# Patient Record
Sex: FEMALE | HISPANIC OR LATINO | Employment: FULL TIME | URBAN - METROPOLITAN AREA
[De-identification: names, ages, dates, MRNs, and addresses within clinical notes are randomized per-mention and may not be internally consistent; named-entity substitution may affect disease eponyms.]

---

## 2022-08-22 LAB
BACTERIA SPEC AEROBE CULT: NO GROWTH
EXTERNAL ABO GROUPING: NORMAL
EXTERNAL ANTIBODY SCREEN: NORMAL
EXTERNAL HEMOGLOBIN: 13.8 G/DL
EXTERNAL HEPATITIS B SURFACE ANTIGEN: NEGATIVE
EXTERNAL PLATELET COUNT: 339 K/ΜL
EXTERNAL RH FACTOR: POSITIVE
EXTERNAL SYPHILIS RPR SCREEN: NORMAL
HEMOGLOBIN FRACTIONATION: NORMAL
HIV 1+2 AB+HIV1 P24 AG SERPL QL IA: NONREACTIVE
RUBV IGG SERPL IA-ACNC: NORMAL

## 2022-09-19 ENCOUNTER — INITIAL PRENATAL (OUTPATIENT)
Dept: OBSTETRICS AND GYNECOLOGY | Facility: CLINIC | Age: 22
End: 2022-09-19

## 2022-09-19 VITALS
BODY MASS INDEX: 36.88 KG/M2 | HEIGHT: 62 IN | WEIGHT: 200.4 LBS | SYSTOLIC BLOOD PRESSURE: 120 MMHG | DIASTOLIC BLOOD PRESSURE: 70 MMHG

## 2022-09-19 DIAGNOSIS — Z34.01 ENCOUNTER FOR SUPERVISION OF NORMAL FIRST PREGNANCY IN FIRST TRIMESTER: Primary | ICD-10-CM

## 2022-09-19 PROCEDURE — 99204 OFFICE O/P NEW MOD 45 MIN: CPT | Performed by: OBSTETRICS & GYNECOLOGY

## 2022-09-19 NOTE — PROGRESS NOTES
"Subjective   Chief Complaint   Patient presents with   • Initial Prenatal Visit     No c/c       Viry Obrien is a 22 y.o. year old .  Patient's last menstrual period was 06/10/2022.  She presents to be seen to initiate prenatal care.  [place]  She reports she saw 2 providers there.  We have most of her records including lab work and nuchal translucency ultrasound.  I do not see the 8-week ultrasound that she reports was done.  I do not have a hepatitis C lab Pap smear or STI screening lab work.  She reports she is currently in between jobs and will be starting her new job soon with full insurance.  She has no complaints today.  She reports she had some bleeding at the beginning of pregnancy but none recently.    Social History    Tobacco Use      Smoking status: Never Smoker      Smokeless tobacco: Never Used      The following portions of the patient's history were reviewed and updated as appropriate:vital signs, allergies, current medications, past family history, past medical history, past social history, past surgical history and problem list.    Objective   /70   Ht 157.5 cm (62\")   Wt 90.9 kg (200 lb 6.4 oz)   LMP 06/10/2022   BMI 36.65 kg/m²     General: well developed; well nourished  no acute distress   Skin: Not performed.   Thyroid: not examined   Heart:  Not performed.   Lungs: breathing is unlabored   Breasts:  Not performed.   Abdomen: Not performed.   Pelvis: Not performed.       Lab Review   OB labs except STI screening, PAP and hepatitis C    Imaging   Pelvic ultrasound report from 12 weeks with normal NT    Assessment & Plan   ASSESSMENT  1. 22 y.o. year old  at 14w3d confirmed by 8 weeks u/s per patient   2. Supervision of low risk pregnancy    PLAN  1. The problem list for pregnancy was initiated today  2. Tests ordered today:  Orders Placed This Encounter   Procedures   • Urine Culture - Urine,     This is an external result entered " through the Results Console.   • US Ob 14 + Weeks Single or First Gestation     Standing Status:   Future     Standing Expiration Date:   9/19/2023     Order Specific Question:   Reason for Exam:     Answer:   Anatomic screening   • HIV-1 / O / 2 Ag / Antibody 4th Generation     This is an external result entered through the Results Console.     Order Specific Question:   Release to patient     Answer:   Routine Release   • Obstetric Panel     This is an external result entered through the Results Console.     Order Specific Question:   Release to patient     Answer:   Routine Release   • Hemoglobinopathy Fractionation Cascade     This is an external result entered through the Results Console.     Order Specific Question:   Release to patient     Answer:   Routine Release   • Hepatitis C Antibody     Standing Status:   Future     Standing Expiration Date:   9/19/2023     Order Specific Question:   Release to patient     Answer:   Routine Release     3. Testing for GC / Chlamydia / trichomonas will request records   The following data needs to be obtained to update her medical records: last PAP and STI screening, ultrasounds.  4. Genetic testing reviewed: NT alone and normal. Declines lab work   5. Information reviewed: exercise in pregnancy, nutrition in pregnancy, weight gain in pregnancy, work and travel restrictions during pregnancy, list of OTC medications acceptable in pregnancy and call coverage groups    Total time spent today with Viry  was 45-59 minutes (level 4).  Of this time, > 50% was spent face-to-face time coordinating care, answering her questions and counseling regarding pathophysiology of her presenting problem along with plans for any diagnositc work-up and treatment.    Follow up: 4 week(s) .  We will review records at next visit to determine whether additional lab work is needed including hepatitis C lab STI screening and Pap smear.  Technically she would need a full physical exam as well.        This note was electronically signed.    Winsome Quintero MD  September 19, 2022

## 2022-09-23 ENCOUNTER — PATIENT ROUNDING (BHMG ONLY) (OUTPATIENT)
Dept: OBSTETRICS AND GYNECOLOGY | Facility: CLINIC | Age: 22
End: 2022-09-23

## 2022-09-23 NOTE — PROGRESS NOTES
A [employer] message has been sent to the patient for PATIENT ROUNDING with [facility].

## 2022-10-17 ENCOUNTER — ROUTINE PRENATAL (OUTPATIENT)
Dept: OBSTETRICS AND GYNECOLOGY | Facility: CLINIC | Age: 22
End: 2022-10-17

## 2022-10-17 VITALS — SYSTOLIC BLOOD PRESSURE: 118 MMHG | WEIGHT: 207 LBS | DIASTOLIC BLOOD PRESSURE: 72 MMHG | BODY MASS INDEX: 37.86 KG/M2

## 2022-10-17 DIAGNOSIS — Z34.02 ENCOUNTER FOR SUPERVISION OF NORMAL FIRST PREGNANCY IN SECOND TRIMESTER: ICD-10-CM

## 2022-10-17 DIAGNOSIS — Z3A.18 18 WEEKS GESTATION OF PREGNANCY: Primary | ICD-10-CM

## 2022-10-17 PROCEDURE — 0502F SUBSEQUENT PRENATAL CARE: CPT | Performed by: NURSE PRACTITIONER

## 2022-10-17 NOTE — PROGRESS NOTES
Chief Complaint   Patient presents with   • Routine Prenatal Visit       HPI: Viry is a  currently at 18w3d who today reports the following: She is moving to [place] after next visit.   Contractions - No; Leaking - No; Vaginal bleeding -  No; Heartburn - mild.    ROS:  GI: Nausea - No ; Constipation - No; Diarrhea - No    Neuro: Headache - No ; Visual change - No      EXAM:  Vitals: See prenatal flowsheet   Abdomen: See prenatal flowsheet   Urine glucose/protein: See prenatal flowsheet   Pelvic: See prenatal flowsheet     Lab Results   Component Value Date    ABO O 2022    RH Positive 2022    ABSCRN Normal 2022       MDM:  Impression: 1. Supervision of low risk pregnancy   Tests done today: 1. none   Topics discussed: 1. Continue with PNV's  2. Prenatal labs reviewed  3. flu vaccine- she is getting at her job tomorrow   Tests scheduled today for her next visit:   U/S for anatomic screening

## 2022-11-06 PROBLEM — Z34.02 ENCOUNTER FOR SUPERVISION OF NORMAL FIRST PREGNANCY IN SECOND TRIMESTER: Status: ACTIVE | Noted: 2022-09-19

## 2022-11-06 PROBLEM — O99.210 OBESITY IN PREGNANCY, ANTEPARTUM: Status: ACTIVE | Noted: 2022-11-06

## 2022-11-07 ENCOUNTER — ROUTINE PRENATAL (OUTPATIENT)
Dept: OBSTETRICS AND GYNECOLOGY | Facility: CLINIC | Age: 22
End: 2022-11-07

## 2022-11-07 VITALS — SYSTOLIC BLOOD PRESSURE: 110 MMHG | BODY MASS INDEX: 38.37 KG/M2 | WEIGHT: 209.8 LBS | DIASTOLIC BLOOD PRESSURE: 70 MMHG

## 2022-11-07 DIAGNOSIS — Z34.02 ENCOUNTER FOR SUPERVISION OF NORMAL FIRST PREGNANCY IN SECOND TRIMESTER: Primary | ICD-10-CM

## 2022-11-07 LAB
C TRACH RRNA SPEC DONR QL NAA+PROBE: NEGATIVE
N GONORRHOEA DNA SPEC QL NAA+PROBE: NEGATIVE

## 2022-11-07 PROCEDURE — 0502F SUBSEQUENT PRENATAL CARE: CPT | Performed by: OBSTETRICS & GYNECOLOGY

## 2022-11-07 NOTE — PROGRESS NOTES
Chief Complaint   Patient presents with   • Routine Prenatal Visit     US done today       HPI: Viry is a  currently at 21w3d who today reports the following:  Contractions - No; Leaking - No; Vaginal bleeding -  No; Heartburn - No.  She had the flu vaccine at work  We still never received her STI screening or Pap smears.  ROS:  GI: Nausea - No ; Constipation - No; Diarrhea - No    Neuro: Headache - No ; Visual change - No      EXAM:  Vitals: See prenatal flowsheet   Abdomen: See prenatal flowsheet   Urine glucose/protein: See prenatal flowsheet   Pelvic: See prenatal flowsheet                 Lab Results   Component Value Date    ABO O 2022    RH Positive 2022    ABSCRN Normal 2022       General: well developed; well nourished  no acute distress   Skin: No suspicious lesions seen   Thyroid: normal to inspection and palpation   Heart:  Not performed.   Lungs: breathing is unlabored   Breasts:  Examined in supine position  Symmetric without masses or skin dimpling  Nipples normal without inversion, lesions or discharge  There are no palpable axillary nodes   Abdomen: soft, non-tender; no masses  no umbilical or inguinal hernias are present  no hepato-splenomegaly   Pelvis: Clinical staff was present for exam  External genitalia:  normal appearance of the external genitalia including Bartholin's and Shaft's glands.  :  urethral meatus normal;  Vaginal:  normal pink mucosa without prolapse or lesions.  Cervix:  normal appearance. ectropian present; blood is seen coming from external cervical os;  Uterus:  normal size, shape and consistency.  Adnexa:  normal bimanual exam of the adnexa.  Rectal:  digital rectal exam not performed; anus visually normal appearing.         MDM:  Impression: 1. Supervision of low risk pregnancy   Tests done today: 1. U/S for anatomic screening - anatomy was not completely seen today   2. Leukorrhea swab and pap smear   3. Hep C Ab   Topics discussed: 1. Continue  with PNV's  2. Prenatal labs reviewed  3. No additional counseling given - she has no specific complaints or concerns   Tests scheduled today for her next visit:    U/S to complete the anatomic screening

## 2022-11-09 ENCOUNTER — DOCUMENTATION (OUTPATIENT)
Dept: OBSTETRICS AND GYNECOLOGY | Facility: CLINIC | Age: 22
End: 2022-11-09

## 2022-11-11 PROBLEM — Z01.419 WELL WOMAN EXAM: Status: ACTIVE | Noted: 2022-11-11

## 2022-11-11 LAB — REF LAB TEST METHOD: NORMAL

## 2022-12-01 ENCOUNTER — LAB (OUTPATIENT)
Dept: LAB | Facility: HOSPITAL | Age: 22
End: 2022-12-01

## 2022-12-01 DIAGNOSIS — Z34.01 ENCOUNTER FOR SUPERVISION OF NORMAL FIRST PREGNANCY IN FIRST TRIMESTER: ICD-10-CM

## 2022-12-01 LAB — HCV AB SER DONR QL: NORMAL

## 2022-12-01 PROCEDURE — 86803 HEPATITIS C AB TEST: CPT

## 2022-12-07 ENCOUNTER — ROUTINE PRENATAL (OUTPATIENT)
Dept: OBSTETRICS AND GYNECOLOGY | Facility: CLINIC | Age: 22
End: 2022-12-07

## 2022-12-07 VITALS — DIASTOLIC BLOOD PRESSURE: 68 MMHG | BODY MASS INDEX: 39.25 KG/M2 | WEIGHT: 214.6 LBS | SYSTOLIC BLOOD PRESSURE: 102 MMHG

## 2022-12-07 DIAGNOSIS — Z34.02 ENCOUNTER FOR SUPERVISION OF NORMAL FIRST PREGNANCY IN SECOND TRIMESTER: Primary | ICD-10-CM

## 2022-12-07 PROCEDURE — 0502F SUBSEQUENT PRENATAL CARE: CPT | Performed by: OBSTETRICS & GYNECOLOGY

## 2022-12-07 NOTE — PROGRESS NOTES
Chief Complaint   Patient presents with   • Routine Prenatal Visit     US [facility] today       HPI: Viry is a  currently at 25w5d who today reports the following:  Contractions - No; Leaking - No; Vaginal bleeding -  No; Heartburn - No.    ROS:  GI: Nausea - No ; Constipation - No; Diarrhea - No    Neuro: Headache - No ; Visual change - No      EXAM:  Vitals: See prenatal flowsheet   Abdomen: See prenatal flowsheet   Urine glucose/protein: See prenatal flowsheet   Pelvic: See prenatal flowsheet     Lab Results   Component Value Date    ABO O 2022    RH Positive 2022    ABSCRN Normal 2022       MDM:  Impression: 1. Supervision of low risk pregnancy   Tests done today: 1. U/S to complete the anatomic screening - anatomy completely seen today   Topics discussed: 1. Continue with PNV's  2. Prenatal labs reviewed  3.  labor signs and symptoms  4. Symptoms of preeclampsia  5. TDAP vaccination recommended between 27-36 weeks gestation OR after birth   Tests scheduled today for her next visit:   GCT  CBC   She knows to get this done prior to 28 weeks    Orders Placed This Encounter   Procedures   • Glucose, Post 50 Gm Glucola     Standing Status:   Future     Standing Expiration Date:   2023   • CBC (No Diff)     Standing Status:   Future     Standing Expiration Date:   2023     Order Specific Question:   Release to patient     Answer:   Immediate   • Ambulatory Referral to Massage Therapy     Referral Priority:   Routine     Referral Type:   Consultation     Referral Reason:   Specialty Services Required     Number of Visits Requested:   1

## 2022-12-15 ENCOUNTER — LAB (OUTPATIENT)
Dept: LAB | Facility: HOSPITAL | Age: 22
End: 2022-12-15

## 2022-12-15 DIAGNOSIS — Z34.02 ENCOUNTER FOR SUPERVISION OF NORMAL FIRST PREGNANCY IN SECOND TRIMESTER: ICD-10-CM

## 2022-12-15 LAB
DEPRECATED RDW RBC AUTO: 40.5 FL (ref 37–54)
ERYTHROCYTE [DISTWIDTH] IN BLOOD BY AUTOMATED COUNT: 13.8 % (ref 12.3–15.4)
GLUCOSE 1H P 100 G GLC PO SERPL-MCNC: 127 MG/DL (ref 65–139)
HCT VFR BLD AUTO: 35.1 % (ref 34–46.6)
HGB BLD-MCNC: 11.7 G/DL (ref 12–15.9)
MCH RBC QN AUTO: 27.3 PG (ref 26.6–33)
MCHC RBC AUTO-ENTMCNC: 33.3 G/DL (ref 31.5–35.7)
MCV RBC AUTO: 81.8 FL (ref 79–97)
PLATELET # BLD AUTO: 348 10*3/MM3 (ref 140–450)
PMV BLD AUTO: 9.5 FL (ref 6–12)
RBC # BLD AUTO: 4.29 10*6/MM3 (ref 3.77–5.28)
WBC NRBC COR # BLD: 12.06 10*3/MM3 (ref 3.4–10.8)

## 2022-12-15 PROCEDURE — 85027 COMPLETE CBC AUTOMATED: CPT

## 2022-12-15 PROCEDURE — 82962 GLUCOSE BLOOD TEST: CPT

## 2022-12-15 PROCEDURE — 82950 GLUCOSE TEST: CPT

## 2023-01-05 ENCOUNTER — ROUTINE PRENATAL (OUTPATIENT)
Dept: OBSTETRICS AND GYNECOLOGY | Facility: CLINIC | Age: 23
End: 2023-01-05
Payer: COMMERCIAL

## 2023-01-05 VITALS — DIASTOLIC BLOOD PRESSURE: 74 MMHG | SYSTOLIC BLOOD PRESSURE: 110 MMHG | BODY MASS INDEX: 40.53 KG/M2 | WEIGHT: 221.6 LBS

## 2023-01-05 DIAGNOSIS — O99.210 OBESITY IN PREGNANCY, ANTEPARTUM: ICD-10-CM

## 2023-01-05 DIAGNOSIS — Z34.03 ENCOUNTER FOR SUPERVISION OF NORMAL FIRST PREGNANCY IN THIRD TRIMESTER: ICD-10-CM

## 2023-01-05 DIAGNOSIS — Z3A.29 29 WEEKS GESTATION OF PREGNANCY: Primary | ICD-10-CM

## 2023-01-05 LAB
GLUCOSE UR STRIP-MCNC: NEGATIVE MG/DL
PROT UR STRIP-MCNC: NEGATIVE MG/DL

## 2023-01-05 PROCEDURE — 90715 TDAP VACCINE 7 YRS/> IM: CPT | Performed by: OBSTETRICS & GYNECOLOGY

## 2023-01-05 PROCEDURE — 0502F SUBSEQUENT PRENATAL CARE: CPT | Performed by: OBSTETRICS & GYNECOLOGY

## 2023-01-05 PROCEDURE — 90471 IMMUNIZATION ADMIN: CPT | Performed by: OBSTETRICS & GYNECOLOGY

## 2023-01-05 NOTE — PROGRESS NOTES
Chief Complaint   Patient presents with   • Routine Prenatal Visit     No c/c       HPI: Viry is a  currently at 29w6d who today reports the following:  Contractions - No; Leaking - No; Vaginal bleeding -  No; Heartburn - No.  She sometimes has sore hands when she wakes up  She states she still craves ice a lot but not every day   ROS:  GI: Nausea - No ; Constipation - No; Diarrhea - No    Neuro: Headache - No ; Visual change - No      EXAM:  Vitals: See prenatal flowsheet   Abdomen: See prenatal flowsheet   Urine glucose/protein: See prenatal flowsheet   Pelvic: See prenatal flowsheet     Lab Results   Component Value Date    HGB 11.7 (L) 12/15/2022    HCT 35.1 12/15/2022     12/15/2022    ABO O 2022    RH Positive 2022    ABSCRN Normal 2022       MDM:  Impression: 1. Supervision of low risk pregnancy   2. Ice craving - normal Hgb at 28 week labs  3. Possible carpal tunnel syndrome of pregnancy    Tests done today: 1. none   Topics discussed: 1. Continue with PNV's  2. Prenatal labs reviewed  3.  labor signs and symptoms  4. Symptoms of preeclampsia  5. TDAP vaccination recommended between 27-36 weeks gestation OR after birth  6. Fetal movement counts   7. Can use wrist splints as needed at night   Tests scheduled today for her next visit:   Iron profile

## 2023-01-05 NOTE — LETTER
VACCINE CONSENT FORM      Patient Name:  Viry Obrien    Patient :  2000      I/We have read, or have been explained, the information about the diseases and the vaccines listed below.  There was an opportunity to ask questions and any questions were answered satisfactorily.  I/We believe that I/we understand the benefits and risks of the vaccines(s) cited, and ask the vaccine(s) listed below be given to me/us or the person named above (for which i have authorized to make the request).      Vaccine(s) give:    Orders Placed This Encounter   Procedures   • Tdap Vaccine Greater Than or Equal To 6yo IM         Medicare patients:    The only vaccine covered under your medical benefit is flu/pneumonia and hepatitis B.  All other may be covered under your \"Part D\" prescription plan and requires you to go to a pharmacy with a Physician orders for administration.  If you still prefer to have it administered at our office, you will receive a bill for the vaccine and administration cost.               Patient Initials                     Patient or Parent/Guardian Signature                    Date        A copy of the appropriate Centers for Disease Control and Prevention Vaccine Information Statements has been provided.

## 2023-01-17 ENCOUNTER — ROUTINE PRENATAL (OUTPATIENT)
Dept: OBSTETRICS AND GYNECOLOGY | Facility: CLINIC | Age: 23
End: 2023-01-17
Payer: COMMERCIAL

## 2023-01-17 VITALS — SYSTOLIC BLOOD PRESSURE: 110 MMHG | WEIGHT: 222.4 LBS | DIASTOLIC BLOOD PRESSURE: 80 MMHG | BODY MASS INDEX: 40.68 KG/M2

## 2023-01-17 DIAGNOSIS — O99.210 OBESITY IN PREGNANCY, ANTEPARTUM: ICD-10-CM

## 2023-01-17 DIAGNOSIS — Z34.03 ENCOUNTER FOR SUPERVISION OF NORMAL FIRST PREGNANCY IN THIRD TRIMESTER: Primary | ICD-10-CM

## 2023-01-17 PROCEDURE — 0502F SUBSEQUENT PRENATAL CARE: CPT | Performed by: OBSTETRICS & GYNECOLOGY

## 2023-01-17 NOTE — PROGRESS NOTES
Chief Complaint   Patient presents with   • Routine Prenatal Visit     No c/c       HPI: Viry is a  currently at 31w4d who today reports the following:  Contractions - No; Leaking - No; Vaginal bleeding -  No; Swelling of extremities - No.  She is still craving ice some.  Her wrists hurt some but she is managing it. She never tried the wrist splints.     ROS:  GI: Nausea - No; Constipation - No; Diarrhea - No    Neuro: Headache - No; Visual change - No      EXAM:  Vitals: See prenatal flowsheet   Abdomen: See prenatal flowsheet   Urine glucose/protein: See prenatal flowsheet   Pelvic: See prenatal flowsheet   MDM:   Impression: 1. Supervision of low risk pregnancy  2. Obesity in pregnancy   Tests done today: 1. none   Topics discussed: 1. Continue with PNV's  2. Prenatal labs reviewed  3. Breast feeding  4. Circumcision  5. Pediatrician selection  6.  labor signs and symptoms  7. Symptoms of preeclampsia   Tests scheduled today for her next visit:   U/S for EFW   Iron profile

## 2023-01-26 ENCOUNTER — LAB (OUTPATIENT)
Dept: LAB | Facility: HOSPITAL | Age: 23
End: 2023-01-26
Payer: COMMERCIAL

## 2023-01-26 DIAGNOSIS — Z34.03 ENCOUNTER FOR SUPERVISION OF NORMAL FIRST PREGNANCY IN THIRD TRIMESTER: ICD-10-CM

## 2023-01-26 PROBLEM — O99.019 IRON DEFICIENCY ANEMIA DURING PREGNANCY: Status: ACTIVE | Noted: 2023-01-26

## 2023-01-26 PROBLEM — D50.9 IRON DEFICIENCY ANEMIA DURING PREGNANCY: Status: ACTIVE | Noted: 2023-01-26

## 2023-01-26 LAB
IRON 24H UR-MRATE: 45 MCG/DL (ref 37–145)
IRON SATN MFR SERPL: 8 % (ref 20–50)
TIBC SERPL-MCNC: 562 MCG/DL (ref 298–536)
TRANSFERRIN SERPL-MCNC: 377 MG/DL (ref 200–360)

## 2023-01-26 PROCEDURE — 84466 ASSAY OF TRANSFERRIN: CPT

## 2023-01-26 PROCEDURE — 83540 ASSAY OF IRON: CPT

## 2023-01-26 RX ORDER — FERROUS SULFATE 325(65) MG
325 TABLET ORAL
Qty: 30 TABLET | Refills: 11 | Status: SHIPPED | OUTPATIENT
Start: 2023-01-26

## 2023-01-31 ENCOUNTER — ROUTINE PRENATAL (OUTPATIENT)
Dept: OBSTETRICS AND GYNECOLOGY | Facility: CLINIC | Age: 23
End: 2023-01-31
Payer: COMMERCIAL

## 2023-01-31 VITALS — BODY MASS INDEX: 41.15 KG/M2 | WEIGHT: 225 LBS | DIASTOLIC BLOOD PRESSURE: 60 MMHG | SYSTOLIC BLOOD PRESSURE: 110 MMHG

## 2023-01-31 DIAGNOSIS — Z3A.33 33 WEEKS GESTATION OF PREGNANCY: Primary | ICD-10-CM

## 2023-01-31 DIAGNOSIS — Z34.03 ENCOUNTER FOR SUPERVISION OF NORMAL FIRST PREGNANCY IN THIRD TRIMESTER: ICD-10-CM

## 2023-01-31 PROCEDURE — 0502F SUBSEQUENT PRENATAL CARE: CPT | Performed by: NURSE PRACTITIONER

## 2023-01-31 NOTE — PROGRESS NOTES
Chief Complaint   Patient presents with   • Routine Prenatal Visit     Ob follow up after US       HPI: Viry is a  currently at 33w4d who today reports the following:  Contractions - No; Leaking - No; Vaginal bleeding -  No; Swelling of extremities - No.    ROS:  GI: Nausea - No; Constipation - No; Diarrhea - No    Neuro: Headache - No; Visual change - No      EXAM:  Vitals: See prenatal flowsheet   Abdomen: See prenatal flowsheet   Urine glucose/protein: See prenatal flowsheet   Pelvic: See prenatal flowsheet   MDM:   Impression: 1. Supervision of low risk pregnancy   2. Obesity in pregnancy  3. Anemia in pregnancy   Tests done today: 1. U/S for EFW - 50th percentile with estimated fetal weight 5 pounds 1 ounce.  MIGEL 12.6   Topics discussed: 1. Continue with PNV's  2. Prenatal labs reviewed  3.  labor signs and symptoms  4. Symptoms of preeclampsia   5. Kick counts reviewed  6. Continue po iron    Tests scheduled today for her next visit:   none

## 2023-02-16 ENCOUNTER — ROUTINE PRENATAL (OUTPATIENT)
Dept: OBSTETRICS AND GYNECOLOGY | Facility: CLINIC | Age: 23
End: 2023-02-16
Payer: COMMERCIAL

## 2023-02-16 VITALS — DIASTOLIC BLOOD PRESSURE: 80 MMHG | SYSTOLIC BLOOD PRESSURE: 110 MMHG | WEIGHT: 230 LBS | BODY MASS INDEX: 42.07 KG/M2

## 2023-02-16 DIAGNOSIS — O99.210 OBESITY IN PREGNANCY, ANTEPARTUM: ICD-10-CM

## 2023-02-16 DIAGNOSIS — Z34.03 ENCOUNTER FOR SUPERVISION OF NORMAL FIRST PREGNANCY IN THIRD TRIMESTER: Primary | ICD-10-CM

## 2023-02-16 DIAGNOSIS — O99.019 IRON DEFICIENCY ANEMIA DURING PREGNANCY: ICD-10-CM

## 2023-02-16 DIAGNOSIS — D50.9 IRON DEFICIENCY ANEMIA DURING PREGNANCY: ICD-10-CM

## 2023-02-16 DIAGNOSIS — Z36.85 ANTENATAL SCREENING FOR STREPTOCOCCUS B: ICD-10-CM

## 2023-02-16 LAB — GP B STREP RRNA SPEC QL PROBE: NEGATIVE

## 2023-02-16 PROCEDURE — 0502F SUBSEQUENT PRENATAL CARE: CPT | Performed by: OBSTETRICS & GYNECOLOGY

## 2023-02-16 NOTE — PROGRESS NOTES
Chief Complaint   Patient presents with   • Routine Prenatal Visit     GBS today       HPI: Viry is a  currently at 35w6d who today reports the following:  Contractions - No; Leaking - No; Vaginal bleeding -  No; Swelling of extremities - No.    ROS:  GI: Nausea - No; Constipation - No; Diarrhea - No    Neuro: Headache - No; Visual change - No      EXAM:  Vitals: See prenatal flowsheet   Abdomen: See prenatal flowsheet   Urine glucose/protein: See prenatal flowsheet   Pelvic: See prenatal flowsheet   MDM:   Impression: 1. Supervision of low risk pregnancy  2. Obesity in pregnancy   Tests done today: 1. GBS testing   Topics discussed: 1. Continue with PNV's  2. Prenatal labs reviewed  3.  labor signs and symptoms  4. Symptoms of preeclampsia   Tests scheduled today for her next visit:   EDIL u/s

## 2023-02-20 ENCOUNTER — DOCUMENTATION (OUTPATIENT)
Dept: OBSTETRICS AND GYNECOLOGY | Facility: CLINIC | Age: 23
End: 2023-02-20
Payer: COMMERCIAL

## 2023-02-23 ENCOUNTER — ROUTINE PRENATAL (OUTPATIENT)
Dept: OBSTETRICS AND GYNECOLOGY | Facility: CLINIC | Age: 23
End: 2023-02-23
Payer: COMMERCIAL

## 2023-02-23 VITALS — SYSTOLIC BLOOD PRESSURE: 116 MMHG | DIASTOLIC BLOOD PRESSURE: 72 MMHG | BODY MASS INDEX: 42.62 KG/M2 | WEIGHT: 233 LBS

## 2023-02-23 DIAGNOSIS — Z34.03 ENCOUNTER FOR SUPERVISION OF NORMAL FIRST PREGNANCY IN THIRD TRIMESTER: Primary | ICD-10-CM

## 2023-02-23 DIAGNOSIS — O99.210 OBESITY IN PREGNANCY, ANTEPARTUM: ICD-10-CM

## 2023-02-23 DIAGNOSIS — O99.019 IRON DEFICIENCY ANEMIA DURING PREGNANCY: ICD-10-CM

## 2023-02-23 DIAGNOSIS — D50.9 IRON DEFICIENCY ANEMIA DURING PREGNANCY: ICD-10-CM

## 2023-02-23 LAB
GLUCOSE UR STRIP-MCNC: NEGATIVE MG/DL
PROT UR STRIP-MCNC: NEGATIVE MG/DL

## 2023-02-23 PROCEDURE — 0502F SUBSEQUENT PRENATAL CARE: CPT | Performed by: OBSTETRICS & GYNECOLOGY

## 2023-02-23 RX ORDER — FERROUS SULFATE 325(65) MG
325 TABLET ORAL
Qty: 30 TABLET | Refills: 11 | OUTPATIENT
Start: 2023-02-23

## 2023-02-23 NOTE — PROGRESS NOTES
Chief Complaint   Patient presents with   • Routine Prenatal Visit       HPI: Viry is a  currently at 36w6d who today reports the following:  Contractions - No; Leaking - No; Vaginal bleeding -  No; Swelling of extremities - No.    ROS:  GI: Nausea - No; Constipation - No; Diarrhea - No    Neuro: Headache - No; Visual change - No      EXAM:  Vitals: See prenatal flowsheet   Abdomen: See prenatal flowsheet   Urine glucose/protein: See prenatal flowsheet   Pelvic: See prenatal flowsheet   MDM:   Impression: 1. Supervision of low risk pregnancy  2. Obesity in pregnancy   Tests done today: 1. U/S for EFW - 3160 grams (61%), AC 86%, cephalic, normal MIGEL   Topics discussed: 1. Continue with PNV's  2. Prenatal labs reviewed  3. Labor signs and symptoms  4. Symptoms of preeclampsia   Tests scheduled today for her next visit:   none

## 2023-03-02 ENCOUNTER — ROUTINE PRENATAL (OUTPATIENT)
Dept: OBSTETRICS AND GYNECOLOGY | Facility: CLINIC | Age: 23
End: 2023-03-02
Payer: COMMERCIAL

## 2023-03-02 VITALS — SYSTOLIC BLOOD PRESSURE: 110 MMHG | DIASTOLIC BLOOD PRESSURE: 70 MMHG

## 2023-03-02 DIAGNOSIS — O99.210 OBESITY IN PREGNANCY, ANTEPARTUM: ICD-10-CM

## 2023-03-02 DIAGNOSIS — Z34.03 ENCOUNTER FOR SUPERVISION OF NORMAL FIRST PREGNANCY IN THIRD TRIMESTER: Primary | ICD-10-CM

## 2023-03-02 PROCEDURE — 0502F SUBSEQUENT PRENATAL CARE: CPT | Performed by: OBSTETRICS & GYNECOLOGY

## 2023-03-02 NOTE — PROGRESS NOTES
Chief Complaint   Patient presents with   • Routine Prenatal Visit     No c/c       HPI: Viry is a  currently at 37w6d who today reports the following:  Contractions - No; Leaking - No; Vaginal bleeding -  No; Swelling of extremities - No.    ROS:  GI: Nausea - No; Constipation - No; Diarrhea - No    Neuro: Headache - No; Visual change - No      EXAM:  Vitals: See prenatal flowsheet   Abdomen: See prenatal flowsheet   Urine glucose/protein: See prenatal flowsheet   Pelvic: See prenatal flowsheet   MDM:   Impression: 1. Supervision of low risk pregnancy  2. Obesity in pregnancy   Tests done today: 1. none   Topics discussed: 1. Continue with PNV's  2. Prenatal labs reviewed  3. Labor signs and symptoms  4. Symptoms of preeclampsia  5. Schedule post dates IOL   Tests scheduled today for her next visit:   U/S for BPP

## 2023-03-10 ENCOUNTER — ROUTINE PRENATAL (OUTPATIENT)
Dept: OBSTETRICS AND GYNECOLOGY | Facility: CLINIC | Age: 23
End: 2023-03-10
Payer: COMMERCIAL

## 2023-03-10 VITALS — SYSTOLIC BLOOD PRESSURE: 110 MMHG | DIASTOLIC BLOOD PRESSURE: 78 MMHG | WEIGHT: 235 LBS | BODY MASS INDEX: 42.98 KG/M2

## 2023-03-10 DIAGNOSIS — O99.210 OBESITY IN PREGNANCY, ANTEPARTUM: ICD-10-CM

## 2023-03-10 DIAGNOSIS — Z34.03 ENCOUNTER FOR SUPERVISION OF NORMAL FIRST PREGNANCY IN THIRD TRIMESTER: Primary | ICD-10-CM

## 2023-03-10 PROCEDURE — 0502F SUBSEQUENT PRENATAL CARE: CPT | Performed by: OBSTETRICS & GYNECOLOGY

## 2023-03-10 NOTE — PROGRESS NOTES
Chief Complaint   Patient presents with   • Routine Prenatal Visit     No c/c       HPI: Viry is a  currently at 39w0d who today reports the following:  Contractions - No; Leaking - No; Vaginal bleeding -  No; Swelling of extremities - No.    ROS:  GI: Nausea - No; Constipation - No; Diarrhea - No    Neuro: Headache - No; Visual change - No      EXAM:  Vitals: See prenatal flowsheet   Abdomen: See prenatal flowsheet   Urine glucose/protein: See prenatal flowsheet   Pelvic: See prenatal flowsheet   MDM:   Impression: 1. Supervision of low risk pregnancy  2. Obesity in pregnancy   Tests done today: 1. none   Topics discussed: 1. Continue with PNV's  2. Prenatal labs reviewed  3. Labor signs and symptoms  4. Symptoms of preeclampsia   Tests scheduled today for her next visit:   U/S for EFW  U/S for BPP

## 2023-03-12 ENCOUNTER — HOSPITAL ENCOUNTER (INPATIENT)
Facility: HOSPITAL | Age: 23
LOS: 2 days | Discharge: HOME | End: 2023-03-14
Attending: OBSTETRICS & GYNECOLOGY | Admitting: OBSTETRICS & GYNECOLOGY
Payer: COMMERCIAL

## 2023-03-12 ENCOUNTER — ANESTHESIA (OUTPATIENT)
Dept: LABOR AND DELIVERY | Facility: HOSPITAL | Age: 23
End: 2023-03-12
Payer: COMMERCIAL

## 2023-03-12 ENCOUNTER — ANESTHESIA EVENT (OUTPATIENT)
Dept: LABOR AND DELIVERY | Facility: HOSPITAL | Age: 23
End: 2023-03-12
Payer: COMMERCIAL

## 2023-03-12 PROBLEM — O99.019 IRON DEFICIENCY ANEMIA DURING PREGNANCY: Status: RESOLVED | Noted: 2023-01-26 | Resolved: 2023-03-12

## 2023-03-12 PROBLEM — O99.210 OBESITY IN PREGNANCY, ANTEPARTUM: Status: RESOLVED | Noted: 2022-11-06 | Resolved: 2023-03-12

## 2023-03-12 PROBLEM — Z37.9 NORMAL LABOR: Status: RESOLVED | Noted: 2023-03-12 | Resolved: 2023-03-12

## 2023-03-12 PROBLEM — Z34.03 ENCOUNTER FOR SUPERVISION OF NORMAL FIRST PREGNANCY IN THIRD TRIMESTER: Status: RESOLVED | Noted: 2022-09-19 | Resolved: 2023-03-12

## 2023-03-12 PROBLEM — Z37.9 NORMAL LABOR: Status: ACTIVE | Noted: 2023-03-12

## 2023-03-12 PROBLEM — D50.9 IRON DEFICIENCY ANEMIA DURING PREGNANCY: Status: RESOLVED | Noted: 2023-01-26 | Resolved: 2023-03-12

## 2023-03-12 LAB
ABO GROUP BLD: NORMAL
ABO GROUP BLD: NORMAL
ALP SERPL-CCNC: 142 U/L (ref 39–117)
ALT SERPL W P-5'-P-CCNC: 9 U/L (ref 1–33)
AST SERPL-CCNC: 15 U/L (ref 1–32)
BILIRUB SERPL-MCNC: 0.2 MG/DL (ref 0–1.2)
BLD GP AB SCN SERPL QL: NEGATIVE
CREAT SERPL-MCNC: 0.33 MG/DL (ref 0.57–1)
DEPRECATED RDW RBC AUTO: 52.9 FL (ref 37–54)
ERYTHROCYTE [DISTWIDTH] IN BLOOD BY AUTOMATED COUNT: 17.2 % (ref 12.3–15.4)
HCT VFR BLD AUTO: 39.7 % (ref 34–46.6)
HGB BLD-MCNC: 12.7 G/DL (ref 12–15.9)
LDH SERPL-CCNC: 211 U/L (ref 135–214)
MCH RBC QN AUTO: 27 PG (ref 26.6–33)
MCHC RBC AUTO-ENTMCNC: 32 G/DL (ref 31.5–35.7)
MCV RBC AUTO: 84.3 FL (ref 79–97)
PLATELET # BLD AUTO: 317 10*3/MM3 (ref 140–450)
PMV BLD AUTO: 9.3 FL (ref 6–12)
RBC # BLD AUTO: 4.71 10*6/MM3 (ref 3.77–5.28)
RH BLD: POSITIVE
RH BLD: POSITIVE
T&S EXPIRATION DATE: NORMAL
URATE SERPL-MCNC: 4.3 MG/DL (ref 2.4–5.7)
WBC NRBC COR # BLD: 9.4 10*3/MM3 (ref 3.4–10.8)

## 2023-03-12 PROCEDURE — 25010000002 BUTORPHANOL PER 1 MG: Performed by: OBSTETRICS & GYNECOLOGY

## 2023-03-12 PROCEDURE — 85027 COMPLETE CBC AUTOMATED: CPT | Performed by: OBSTETRICS & GYNECOLOGY

## 2023-03-12 PROCEDURE — 51703 INSERT BLADDER CATH COMPLEX: CPT

## 2023-03-12 PROCEDURE — 86901 BLOOD TYPING SEROLOGIC RH(D): CPT

## 2023-03-12 PROCEDURE — C1755 CATHETER, INTRASPINAL: HCPCS

## 2023-03-12 PROCEDURE — 84450 TRANSFERASE (AST) (SGOT): CPT | Performed by: OBSTETRICS & GYNECOLOGY

## 2023-03-12 PROCEDURE — 86900 BLOOD TYPING SEROLOGIC ABO: CPT | Performed by: OBSTETRICS & GYNECOLOGY

## 2023-03-12 PROCEDURE — 59025 FETAL NON-STRESS TEST: CPT

## 2023-03-12 PROCEDURE — 84075 ASSAY ALKALINE PHOSPHATASE: CPT | Performed by: OBSTETRICS & GYNECOLOGY

## 2023-03-12 PROCEDURE — 84550 ASSAY OF BLOOD/URIC ACID: CPT | Performed by: OBSTETRICS & GYNECOLOGY

## 2023-03-12 PROCEDURE — S0260 H&P FOR SURGERY: HCPCS | Performed by: OBSTETRICS & GYNECOLOGY

## 2023-03-12 PROCEDURE — 0KQM0ZZ REPAIR PERINEUM MUSCLE, OPEN APPROACH: ICD-10-PCS | Performed by: OBSTETRICS & GYNECOLOGY

## 2023-03-12 PROCEDURE — 25010000002 FENTANYL CITRATE (PF) 50 MCG/ML SOLUTION: Performed by: ANESTHESIOLOGY

## 2023-03-12 PROCEDURE — 86850 RBC ANTIBODY SCREEN: CPT | Performed by: OBSTETRICS & GYNECOLOGY

## 2023-03-12 PROCEDURE — 59400 OBSTETRICAL CARE: CPT | Performed by: OBSTETRICS & GYNECOLOGY

## 2023-03-12 PROCEDURE — C1755 CATHETER, INTRASPINAL: HCPCS | Performed by: ANESTHESIOLOGY

## 2023-03-12 PROCEDURE — 86900 BLOOD TYPING SEROLOGIC ABO: CPT

## 2023-03-12 PROCEDURE — 82565 ASSAY OF CREATININE: CPT | Performed by: OBSTETRICS & GYNECOLOGY

## 2023-03-12 PROCEDURE — 86901 BLOOD TYPING SEROLOGIC RH(D): CPT | Performed by: OBSTETRICS & GYNECOLOGY

## 2023-03-12 PROCEDURE — 25010000002 ROPIVACAINE PER 1 MG: Performed by: ANESTHESIOLOGY

## 2023-03-12 PROCEDURE — 83615 LACTATE (LD) (LDH) ENZYME: CPT | Performed by: OBSTETRICS & GYNECOLOGY

## 2023-03-12 PROCEDURE — 84460 ALANINE AMINO (ALT) (SGPT): CPT | Performed by: OBSTETRICS & GYNECOLOGY

## 2023-03-12 PROCEDURE — 82247 BILIRUBIN TOTAL: CPT | Performed by: OBSTETRICS & GYNECOLOGY

## 2023-03-12 RX ORDER — EPHEDRINE SULFATE 5 MG/ML
10 INJECTION INTRAVENOUS
Status: DISCONTINUED | OUTPATIENT
Start: 2023-03-12 | End: 2023-03-12 | Stop reason: HOSPADM

## 2023-03-12 RX ORDER — FENTANYL CITRATE 50 UG/ML
INJECTION, SOLUTION INTRAMUSCULAR; INTRAVENOUS AS NEEDED
Status: DISCONTINUED | OUTPATIENT
Start: 2023-03-12 | End: 2023-03-12 | Stop reason: SURG

## 2023-03-12 RX ORDER — ONDANSETRON 4 MG/1
4 TABLET, FILM COATED ORAL EVERY 6 HOURS PRN
Status: DISCONTINUED | OUTPATIENT
Start: 2023-03-12 | End: 2023-03-12 | Stop reason: HOSPADM

## 2023-03-12 RX ORDER — ACETAMINOPHEN 500 MG
1000 TABLET ORAL ONCE
Status: COMPLETED | OUTPATIENT
Start: 2023-03-12 | End: 2023-03-12

## 2023-03-12 RX ORDER — SODIUM CHLORIDE, SODIUM LACTATE, POTASSIUM CHLORIDE, CALCIUM CHLORIDE 600; 310; 30; 20 MG/100ML; MG/100ML; MG/100ML; MG/100ML
150 INJECTION, SOLUTION INTRAVENOUS CONTINUOUS
Status: DISCONTINUED | OUTPATIENT
Start: 2023-03-12 | End: 2023-03-13

## 2023-03-12 RX ORDER — LIDOCAINE HYDROCHLORIDE 10 MG/ML
5 INJECTION, SOLUTION EPIDURAL; INFILTRATION; INTRACAUDAL; PERINEURAL AS NEEDED
Status: DISCONTINUED | OUTPATIENT
Start: 2023-03-12 | End: 2023-03-12 | Stop reason: HOSPADM

## 2023-03-12 RX ORDER — OXYTOCIN/0.9 % SODIUM CHLORIDE 30/500 ML
2-20 PLASTIC BAG, INJECTION (ML) INTRAVENOUS
Status: DISCONTINUED | OUTPATIENT
Start: 2023-03-12 | End: 2023-03-12 | Stop reason: HOSPADM

## 2023-03-12 RX ORDER — MAGNESIUM CARB/ALUMINUM HYDROX 105-160MG
30 TABLET,CHEWABLE ORAL ONCE
Status: DISCONTINUED | OUTPATIENT
Start: 2023-03-12 | End: 2023-03-12 | Stop reason: HOSPADM

## 2023-03-12 RX ORDER — TRISODIUM CITRATE DIHYDRATE AND CITRIC ACID MONOHYDRATE 500; 334 MG/5ML; MG/5ML
30 SOLUTION ORAL ONCE
Status: DISCONTINUED | OUTPATIENT
Start: 2023-03-12 | End: 2023-03-12 | Stop reason: HOSPADM

## 2023-03-12 RX ORDER — SODIUM CHLORIDE 0.9 % (FLUSH) 0.9 %
10 SYRINGE (ML) INJECTION AS NEEDED
Status: DISCONTINUED | OUTPATIENT
Start: 2023-03-12 | End: 2023-03-12 | Stop reason: HOSPADM

## 2023-03-12 RX ORDER — OXYTOCIN/0.9 % SODIUM CHLORIDE 30/500 ML
999 PLASTIC BAG, INJECTION (ML) INTRAVENOUS ONCE
Status: COMPLETED | OUTPATIENT
Start: 2023-03-12 | End: 2023-03-12

## 2023-03-12 RX ORDER — ONDANSETRON 2 MG/ML
4 INJECTION INTRAMUSCULAR; INTRAVENOUS EVERY 6 HOURS PRN
Status: DISCONTINUED | OUTPATIENT
Start: 2023-03-12 | End: 2023-03-12 | Stop reason: HOSPADM

## 2023-03-12 RX ORDER — SODIUM CHLORIDE, SODIUM LACTATE, POTASSIUM CHLORIDE, CALCIUM CHLORIDE 600; 310; 30; 20 MG/100ML; MG/100ML; MG/100ML; MG/100ML
125 INJECTION, SOLUTION INTRAVENOUS CONTINUOUS
Status: DISCONTINUED | OUTPATIENT
Start: 2023-03-12 | End: 2023-03-12 | Stop reason: SDUPTHER

## 2023-03-12 RX ORDER — DIPHENHYDRAMINE HYDROCHLORIDE 50 MG/ML
12.5 INJECTION INTRAMUSCULAR; INTRAVENOUS EVERY 8 HOURS PRN
Status: DISCONTINUED | OUTPATIENT
Start: 2023-03-12 | End: 2023-03-12 | Stop reason: HOSPADM

## 2023-03-12 RX ORDER — METHYLERGONOVINE MALEATE 0.2 MG/ML
200 INJECTION INTRAVENOUS ONCE AS NEEDED
Status: DISCONTINUED | OUTPATIENT
Start: 2023-03-12 | End: 2023-03-12 | Stop reason: HOSPADM

## 2023-03-12 RX ORDER — OXYTOCIN/0.9 % SODIUM CHLORIDE 30/500 ML
250 PLASTIC BAG, INJECTION (ML) INTRAVENOUS CONTINUOUS
Status: ACTIVE | OUTPATIENT
Start: 2023-03-12 | End: 2023-03-12

## 2023-03-12 RX ORDER — CARBOPROST TROMETHAMINE 250 UG/ML
250 INJECTION, SOLUTION INTRAMUSCULAR AS NEEDED
Status: DISCONTINUED | OUTPATIENT
Start: 2023-03-12 | End: 2023-03-12 | Stop reason: HOSPADM

## 2023-03-12 RX ORDER — SODIUM CHLORIDE 0.9 % (FLUSH) 0.9 %
3 SYRINGE (ML) INJECTION EVERY 12 HOURS SCHEDULED
Status: DISCONTINUED | OUTPATIENT
Start: 2023-03-12 | End: 2023-03-12 | Stop reason: HOSPADM

## 2023-03-12 RX ORDER — FAMOTIDINE 10 MG/ML
20 INJECTION, SOLUTION INTRAVENOUS ONCE AS NEEDED
Status: DISCONTINUED | OUTPATIENT
Start: 2023-03-12 | End: 2023-03-12 | Stop reason: HOSPADM

## 2023-03-12 RX ORDER — EPHEDRINE SULFATE 5 MG/ML
INJECTION INTRAVENOUS
Status: COMPLETED
Start: 2023-03-12 | End: 2023-03-12

## 2023-03-12 RX ORDER — ONDANSETRON 2 MG/ML
4 INJECTION INTRAMUSCULAR; INTRAVENOUS ONCE AS NEEDED
Status: DISCONTINUED | OUTPATIENT
Start: 2023-03-12 | End: 2023-03-12 | Stop reason: HOSPADM

## 2023-03-12 RX ORDER — MISOPROSTOL 200 UG/1
800 TABLET ORAL AS NEEDED
Status: DISCONTINUED | OUTPATIENT
Start: 2023-03-12 | End: 2023-03-12 | Stop reason: HOSPADM

## 2023-03-12 RX ORDER — METOCLOPRAMIDE HYDROCHLORIDE 5 MG/ML
10 INJECTION INTRAMUSCULAR; INTRAVENOUS ONCE AS NEEDED
Status: DISCONTINUED | OUTPATIENT
Start: 2023-03-12 | End: 2023-03-12 | Stop reason: HOSPADM

## 2023-03-12 RX ORDER — LIDOCAINE HYDROCHLORIDE AND EPINEPHRINE 15; 5 MG/ML; UG/ML
INJECTION, SOLUTION EPIDURAL AS NEEDED
Status: DISCONTINUED | OUTPATIENT
Start: 2023-03-12 | End: 2023-03-12 | Stop reason: SURG

## 2023-03-12 RX ORDER — BUTORPHANOL TARTRATE 1 MG/ML
1 INJECTION, SOLUTION INTRAMUSCULAR; INTRAVENOUS
Status: DISCONTINUED | OUTPATIENT
Start: 2023-03-12 | End: 2023-03-12 | Stop reason: HOSPADM

## 2023-03-12 RX ORDER — BUPIVACAINE HYDROCHLORIDE 2.5 MG/ML
INJECTION, SOLUTION EPIDURAL; INFILTRATION; INTRACAUDAL AS NEEDED
Status: DISCONTINUED | OUTPATIENT
Start: 2023-03-12 | End: 2023-03-12 | Stop reason: SURG

## 2023-03-12 RX ADMIN — SODIUM CHLORIDE, POTASSIUM CHLORIDE, SODIUM LACTATE AND CALCIUM CHLORIDE 300 ML: 600; 310; 30; 20 INJECTION, SOLUTION INTRAVENOUS at 15:48

## 2023-03-12 RX ADMIN — EPHEDRINE SULFATE 10 MG: 5 INJECTION INTRAVENOUS at 15:48

## 2023-03-12 RX ADMIN — SODIUM CHLORIDE, POTASSIUM CHLORIDE, SODIUM LACTATE AND CALCIUM CHLORIDE 150 ML/HR: 600; 310; 30; 20 INJECTION, SOLUTION INTRAVENOUS at 16:06

## 2023-03-12 RX ADMIN — Medication 999 ML/HR: at 20:52

## 2023-03-12 RX ADMIN — LIDOCAINE HYDROCHLORIDE AND EPINEPHRINE 3 ML: 15; 5 INJECTION, SOLUTION EPIDURAL at 12:24

## 2023-03-12 RX ADMIN — BUTORPHANOL TARTRATE 2 MG: 2 INJECTION, SOLUTION INTRAMUSCULAR; INTRAVENOUS at 08:10

## 2023-03-12 RX ADMIN — Medication 250 ML/HR: at 21:15

## 2023-03-12 RX ADMIN — ACETAMINOPHEN 1000 MG: 500 TABLET ORAL at 21:29

## 2023-03-12 RX ADMIN — LIDOCAINE HYDROCHLORIDE AND EPINEPHRINE 2 ML: 15; 5 INJECTION, SOLUTION EPIDURAL at 12:25

## 2023-03-12 RX ADMIN — SODIUM CHLORIDE, POTASSIUM CHLORIDE, SODIUM LACTATE AND CALCIUM CHLORIDE 125 ML/HR: 600; 310; 30; 20 INJECTION, SOLUTION INTRAVENOUS at 12:19

## 2023-03-12 RX ADMIN — SODIUM CHLORIDE, POTASSIUM CHLORIDE, SODIUM LACTATE AND CALCIUM CHLORIDE 125 ML/HR: 600; 310; 30; 20 INJECTION, SOLUTION INTRAVENOUS at 05:32

## 2023-03-12 RX ADMIN — FENTANYL CITRATE 100 MCG: 50 INJECTION, SOLUTION INTRAMUSCULAR; INTRAVENOUS at 12:26

## 2023-03-12 RX ADMIN — SODIUM CHLORIDE, POTASSIUM CHLORIDE, SODIUM LACTATE AND CALCIUM CHLORIDE 1000 ML: 600; 310; 30; 20 INJECTION, SOLUTION INTRAVENOUS at 11:50

## 2023-03-12 RX ADMIN — BUPIVACAINE HYDROCHLORIDE 12 ML: 2.5 INJECTION, SOLUTION EPIDURAL; INFILTRATION; INTRACAUDAL; PERINEURAL at 12:27

## 2023-03-12 NOTE — ANESTHESIA PROCEDURE NOTES
Labor Epidural      Patient reassessed immediately prior to procedure    Patient location during procedure: OB  Performed By  Anesthesiologist: Jose Glez DO  Preanesthetic Checklist  Completed: patient identified, IV checked, site marked, risks and benefits discussed, surgical consent, monitors and equipment checked, pre-op evaluation and timeout performed  Prep:  Pt Position:sitting  Sterile Tech:gloves, mask, sterile barrier and cap  Prep:chlorhexidine gluconate and isopropyl alcohol  Monitoring:blood pressure monitoring and continuous pulse oximetry  Epidural Block Procedure:  Approach:midline  Guidance:landmark technique and palpation technique  Location:L3-L4  Needle Type:Tuohy  Needle Gauge:17 G  Loss of Resistance Medium: air  Loss of Resistance: 7cm  Cath Depth at skin:14 cm  Paresthesia: none  Aspiration:negative  Test Dose:negative  Number of Attempts: 1  Post Assessment:  Dressing:secured with tape and occlusive dressing applied (Tegaderm Placed)  Pt Tolerance:patient tolerated the procedure well with no apparent complications  Complications:no

## 2023-03-12 NOTE — PLAN OF CARE
Goal Outcome Evaluation: close to delivery, continuing to labor down. Epidural infusing, patient comfortable.               Outcome Evaluation: progressing towards vaginal delivery as expected- epidural infusing, pitocin per protocol.

## 2023-03-12 NOTE — PROGRESS NOTES
CORTNEY Deborah Obrien  : 2000  MRN: [id number]  CSN: [id number]    Labor progress note    Subjective   She is comfortable with the epidural.     Objective   Min/max vitals past 24 hours:  Temp  Min: 97.6 °F (36.4 °C)  Max: 98.7 °F (37.1 °C)   BP  Min: 0/0  Max: 123/72   Pulse  Min: 70  Max: 112   Resp  Min: 16  Max: 18        FHT's: reassuring and category 1   Cervix: was checked (by me): 10 cm / 100 % / +2   Contractions: regular every 2 minutes        Assessment   1. IUP at 39w2d  2. Progressing in labor  3. Fetal status reassuring     Plan   1. Begin to push    Winsome Quintero MD  3/12/2023  19:45 EDT

## 2023-03-12 NOTE — ANESTHESIA PREPROCEDURE EVALUATION
Anesthesia Evaluation     Patient summary reviewed and Nursing notes reviewed   NPO Solid Status: > 6 hours  NPO Liquid Status: < 2 hours           Airway   Mallampati: II  TM distance: >3 FB  Neck ROM: full  No difficulty expected  Dental      Pulmonary - negative pulmonary ROS   Cardiovascular - negative cardio ROS        Neuro/Psych- negative ROS  GI/Hepatic/Renal/Endo    (+) morbid obesity,      Musculoskeletal (-) negative ROS    Abdominal    Substance History - negative use     OB/GYN    (+) Pregnant,         Other                        Anesthesia Plan    ASA 2     epidural       Anesthetic plan, risks, benefits, and alternatives have been provided, discussed and informed consent has been obtained with: patient.    Use of blood products discussed with patient .       CODE STATUS:    Level Of Support Discussed With: Patient  Code Status (Patient has no pulse and is not breathing): CPR (Attempt to Resuscitate)  Medical Interventions (Patient has pulse or is breathing): Full Support  Release to patient: Routine Release

## 2023-03-12 NOTE — PROCEDURES
Transcervical avery placed per physician request.  FHT's class 1.  Patient tolerated well. 24 Portuguese, 60ml.    Chadd Valdez MD  3/12/2023  07:02 EDT

## 2023-03-12 NOTE — PLAN OF CARE
Problem: Adult Inpatient Plan of Care  Goal: Plan of Care Review  Outcome: Ongoing, Progressing  Goal: Patient-Specific Goal (Individualized)  Outcome: Ongoing, Progressing  Goal: Absence of Hospital-Acquired Illness or Injury  Outcome: Ongoing, Progressing  Goal: Optimal Comfort and Wellbeing  Outcome: Ongoing, Progressing  Goal: Readiness for Transition of Care  Outcome: Ongoing, Progressing  Intervention: Mutually Develop Transition Plan  Recent Flowsheet Documentation  Taken 3/12/2023 0455 by Michelle Mc RN  Equipment Currently Used at Home: none  Taken 3/12/2023 0457 by Michelle Mc RN  Transportation Anticipated: family or friend will provide  Patient/Family Anticipated Services at Transition: none  Patient/Family Anticipates Transition to: home     Problem: Bleeding (Labor)  Goal: Hemostasis  Outcome: Ongoing, Progressing     Problem: Change in Fetal Wellbeing (Labor)  Goal: Stable Fetal Wellbeing  Outcome: Ongoing, Progressing     Problem: Delayed Labor Progression (Labor)  Goal: Effective Progression to Delivery  Outcome: Ongoing, Progressing     Problem: Infection (Labor)  Goal: Absence of Infection Signs and Symptoms  Outcome: Ongoing, Progressing     Problem: Labor Pain (Labor)  Goal: Acceptable Pain Control  Outcome: Ongoing, Progressing     Problem: Uterine Tachysystole (Labor)  Goal: Normal Uterine Contraction Pattern  Outcome: Ongoing, Progressing   Goal Outcome Evaluation:

## 2023-03-13 LAB
BASOPHILS # BLD AUTO: 0.03 10*3/MM3 (ref 0–0.2)
BASOPHILS NFR BLD AUTO: 0.2 % (ref 0–1.5)
DEPRECATED RDW RBC AUTO: 53.7 FL (ref 37–54)
EOSINOPHIL # BLD AUTO: 0.02 10*3/MM3 (ref 0–0.4)
EOSINOPHIL NFR BLD AUTO: 0.1 % (ref 0.3–6.2)
ERYTHROCYTE [DISTWIDTH] IN BLOOD BY AUTOMATED COUNT: 17.2 % (ref 12.3–15.4)
HCT VFR BLD AUTO: 35.8 % (ref 34–46.6)
HGB BLD-MCNC: 11.3 G/DL (ref 12–15.9)
IMM GRANULOCYTES # BLD AUTO: 0.06 10*3/MM3 (ref 0–0.05)
IMM GRANULOCYTES NFR BLD AUTO: 0.4 % (ref 0–0.5)
LYMPHOCYTES # BLD AUTO: 1.02 10*3/MM3 (ref 0.7–3.1)
LYMPHOCYTES NFR BLD AUTO: 6.9 % (ref 19.6–45.3)
MCH RBC QN AUTO: 27.1 PG (ref 26.6–33)
MCHC RBC AUTO-ENTMCNC: 31.6 G/DL (ref 31.5–35.7)
MCV RBC AUTO: 85.9 FL (ref 79–97)
MONOCYTES # BLD AUTO: 0.96 10*3/MM3 (ref 0.1–0.9)
MONOCYTES NFR BLD AUTO: 6.5 % (ref 5–12)
NEUTROPHILS NFR BLD AUTO: 12.71 10*3/MM3 (ref 1.7–7)
NEUTROPHILS NFR BLD AUTO: 85.9 % (ref 42.7–76)
NRBC BLD AUTO-RTO: 0 /100 WBC (ref 0–0.2)
PLATELET # BLD AUTO: 308 10*3/MM3 (ref 140–450)
PMV BLD AUTO: 9.4 FL (ref 6–12)
RBC # BLD AUTO: 4.17 10*6/MM3 (ref 3.77–5.28)
WBC NRBC COR # BLD: 14.8 10*3/MM3 (ref 3.4–10.8)

## 2023-03-13 PROCEDURE — 85025 COMPLETE CBC W/AUTO DIFF WBC: CPT | Performed by: OBSTETRICS & GYNECOLOGY

## 2023-03-13 PROCEDURE — 0503F POSTPARTUM CARE VISIT: CPT | Performed by: STUDENT IN AN ORGANIZED HEALTH CARE EDUCATION/TRAINING PROGRAM

## 2023-03-13 RX ORDER — ACETAMINOPHEN 325 MG/1
650 TABLET ORAL EVERY 6 HOURS PRN
Status: DISCONTINUED | OUTPATIENT
Start: 2023-03-13 | End: 2023-03-14 | Stop reason: HOSPADM

## 2023-03-13 RX ORDER — PRENATAL VIT/IRON FUM/FOLIC AC 27MG-0.8MG
1 TABLET ORAL DAILY
Status: DISCONTINUED | OUTPATIENT
Start: 2023-03-13 | End: 2023-03-14 | Stop reason: HOSPADM

## 2023-03-13 RX ORDER — DOCUSATE SODIUM 100 MG/1
100 CAPSULE, LIQUID FILLED ORAL 2 TIMES DAILY
Status: DISCONTINUED | OUTPATIENT
Start: 2023-03-13 | End: 2023-03-14 | Stop reason: HOSPADM

## 2023-03-13 RX ORDER — HYDROCODONE BITARTRATE AND ACETAMINOPHEN 10; 325 MG/1; MG/1
1 TABLET ORAL EVERY 4 HOURS PRN
Status: DISCONTINUED | OUTPATIENT
Start: 2023-03-13 | End: 2023-03-14 | Stop reason: HOSPADM

## 2023-03-13 RX ORDER — BISACODYL 10 MG
10 SUPPOSITORY, RECTAL RECTAL DAILY PRN
Status: DISCONTINUED | OUTPATIENT
Start: 2023-03-13 | End: 2023-03-14 | Stop reason: HOSPADM

## 2023-03-13 RX ORDER — ONDANSETRON 4 MG/1
4 TABLET, FILM COATED ORAL EVERY 8 HOURS PRN
Status: DISCONTINUED | OUTPATIENT
Start: 2023-03-13 | End: 2023-03-14 | Stop reason: HOSPADM

## 2023-03-13 RX ORDER — HYDROCORTISONE 25 MG/G
1 CREAM TOPICAL AS NEEDED
Status: DISCONTINUED | OUTPATIENT
Start: 2023-03-13 | End: 2023-03-14 | Stop reason: HOSPADM

## 2023-03-13 RX ORDER — IBUPROFEN 600 MG/1
600 TABLET ORAL EVERY 6 HOURS PRN
Status: DISCONTINUED | OUTPATIENT
Start: 2023-03-13 | End: 2023-03-14 | Stop reason: HOSPADM

## 2023-03-13 RX ORDER — HYDROCODONE BITARTRATE AND ACETAMINOPHEN 5; 325 MG/1; MG/1
1 TABLET ORAL EVERY 4 HOURS PRN
Status: DISCONTINUED | OUTPATIENT
Start: 2023-03-13 | End: 2023-03-14 | Stop reason: HOSPADM

## 2023-03-13 RX ORDER — SODIUM CHLORIDE 0.9 % (FLUSH) 0.9 %
1-10 SYRINGE (ML) INJECTION AS NEEDED
Status: DISCONTINUED | OUTPATIENT
Start: 2023-03-13 | End: 2023-03-14 | Stop reason: HOSPADM

## 2023-03-13 RX ORDER — DIPHENHYDRAMINE HCL 25 MG
25 CAPSULE ORAL NIGHTLY PRN
Status: DISCONTINUED | OUTPATIENT
Start: 2023-03-13 | End: 2023-03-14 | Stop reason: HOSPADM

## 2023-03-13 RX ADMIN — Medication: at 01:29

## 2023-03-13 RX ADMIN — DOCUSATE SODIUM 100 MG: 100 CAPSULE, LIQUID FILLED ORAL at 21:25

## 2023-03-13 RX ADMIN — Medication 1 APPLICATION: at 01:29

## 2023-03-13 RX ADMIN — PRENATAL VITAMINS-IRON FUMARATE 27 MG IRON-FOLIC ACID 0.8 MG TABLET 1 TABLET: at 08:30

## 2023-03-13 RX ADMIN — WITCH HAZEL 1 PAD: 500 SOLUTION RECTAL; TOPICAL at 01:29

## 2023-03-13 RX ADMIN — IBUPROFEN 600 MG: 600 TABLET, FILM COATED ORAL at 08:31

## 2023-03-13 RX ADMIN — BENZOCAINE 1 APPLICATION: 5.6 OINTMENT TOPICAL at 01:29

## 2023-03-13 RX ADMIN — DOCUSATE SODIUM 100 MG: 100 CAPSULE, LIQUID FILLED ORAL at 08:30

## 2023-03-13 NOTE — PROGRESS NOTES
CORTNEY Deborah Obrien  : 2000  MRN: [id number]  CSN: [id number]    Hospital Day: 2    Postpartum Day #1  Subjective     CC: hospital follow-up    Her pain is well controlled. Vaginal bleeding is normal in amount. She is ambulating without difficulty. She is voiding without difficulty. Her baby is doing well..     Objective     Min/max vitals past 24 hours:   Temp  Min: 97.6 °F (36.4 °C)  Max: 100.5 °F (38.1 °C)  BP  Min: 0/0  Max: 131/69  Pulse  Min: 70  Max: 116  Resp  Min: 16  Max: 18        General: well developed; well nourished  no acute distress   Abdomen: soft, non-tender; no masses  no umbilical or inguinal hernias are present   Pelvic: Not performed   Ext: Calves NT     Lab Results   Component Value Date    WBC 14.80 (H) 2023    HGB 11.3 (L) 2023    HCT 35.8 2023    MCV 85.9 2023     2023    RH Positive 2023    HEPBSAG Negative 2022        Assessment   1. Postpartum Day #1 S/P vaginal delivery  2. Doing well     Plan   1. Continue routine postpartum care    Kelly Valdez MD  3/13/2023  08:57 EDT

## 2023-03-13 NOTE — ANESTHESIA POSTPROCEDURE EVALUATION
Patient: Viry Obrien    Procedure Summary     Date: 03/12/23 Room / Location:     Anesthesia Start: 1212 Anesthesia Stop: 2052    Procedure: LABOR ANALGESIA Diagnosis:     Scheduled Providers:  Provider: Jose Glez DO    Anesthesia Type: epidural ASA Status: 2          Anesthesia Type: epidural    Vitals  Vitals Value Taken Time   BP 95/55 03/13/23 0715   Temp 98.5 °F (36.9 °C) 03/13/23 0715   Pulse 90 03/13/23 0715   Resp 18 03/13/23 0715   SpO2             Post Anesthesia Care and Evaluation    Patient location during evaluation: bedside  Patient participation: complete - patient participated  Level of consciousness: awake and alert  Pain management: adequate    Airway patency: patent  Anesthetic complications: No anesthetic complications    Cardiovascular status: acceptable  Respiratory status: acceptable  Hydration status: acceptable  Post Neuraxial Block status: Motor and sensory function returned to baseline and No signs or symptoms of PDPH

## 2023-03-13 NOTE — L&D DELIVERY NOTE
[facility]   Vaginal Delivery Note    Patient Name: Viry Obrien  : 2000  MRN: [id number]    Date of Delivery: 3/12/2023     Diagnosis     Pre & Post-Delivery:  Intrauterine pregnancy at 39w2d  Labor status: Spontaneous Onset of Labor     Normal labor  PROM with augmentation with cervical avery and pitocin           Problem List    Transfer to Postpartum     Review the Delivery Report for details.     Delivery     Delivery:      YOB: 2025    Time of Birth:  Gestational Age 8:48 PM   39w2d     Anesthesia: Epidural     Delivering clinician:     Forceps?   No   Vacuum? No    Shoulder dystocia present: No        Delivery narrative:  Viable male infant delivered OA over intact perineum. Anterior shoulder delivered easily followed by posterior shoulder and remainder of infant body. Infant placed on maternal abdomen, bulb suctioned and dried. Umbilical cord clamped x2 and cut after 60 second delay. Placenta then delivered spontaneously with gentle traction, intact with 3VC. Second degree laceration repaired with 2-0 vicryl suture. Superficial bilateral labial lacerations hemostatic. Fundus then firm. Counts correct. EBL 400ml.           Infant     Findings: male  infant     Infant observations: Weight: 3675 g (8 lb 1.6 oz)   Length: 21  in  Observations/Comments:        Apgars:   @ 1 minute /      @ 5 minutes         Placenta & Cord         Placenta delivered    at   3/12/2023  8:52 PM     Cord:   present.   Nuchal Cord?  no   Cord blood obtained:     Cord gases obtained:      Cord gas results: Venous:  No results found for: PHCVEN    Arterial:  No results found for: PHCART     Repair      Episiotomy: Not recorded     No    Lacerations: Yes  Laceration Information  Laceration Repaired?   Perineal:  second degree yes   Periurethral:       Labial:  superficial bilateral  no   Sulcus:       Vaginal:       Cervical:         Suture used for repair: 2-0 Vicryl   Estimated Blood Loss:  300ml      Quantitative Blood Loss:          Complications     none    Disposition     Mother to Mother Baby/Postpartum  in stable condition currently.  Baby to remains with mom  in stable condition currently.    Winsome Quintero MD  03/12/23  21:18 EDT

## 2023-03-13 NOTE — LACTATION NOTE
03/13/23 1737   Maternal Information   Date of Referral 03/13/23   Person Making Referral lactation consultant  (courtesy visit, newly postpartum)   Maternal Reason for Referral latch not attained;no prior breastfeeding experience  (pt reports she tried latching but baby wouldn't latch)   Maternal Infant Feeding   Maternal Emotional State relaxed;receptive   Milk Expression/Equipment   Breast Pump Type double electric, hospital grade   Equipment for Home Use breast pump ordered through insurance  (motif at home)   Breast Pumping   Breast Pumping Interventions early pumping promoted;frequent pumping encouraged  (at baby's feeding times, to encourage breastmilk production)     Completed breastfeeding education encouraging pt to achieve a deep, comfortable latch throughout breastfeeding, which should be at least every 3 hours while giving baby stimulation for high quality transfer of breastmilk.PRN Lactation Consultant/Clinic contact encouraged.

## 2023-03-14 VITALS
HEART RATE: 75 BPM | TEMPERATURE: 98.3 F | WEIGHT: 235 LBS | RESPIRATION RATE: 18 BRPM | BODY MASS INDEX: 41.64 KG/M2 | SYSTOLIC BLOOD PRESSURE: 93 MMHG | DIASTOLIC BLOOD PRESSURE: 53 MMHG | HEIGHT: 63 IN

## 2023-03-14 PROCEDURE — 0503F POSTPARTUM CARE VISIT: CPT | Performed by: OBSTETRICS & GYNECOLOGY

## 2023-03-14 RX ORDER — IBUPROFEN 600 MG/1
600 TABLET ORAL EVERY 6 HOURS PRN
Qty: 60 TABLET | Refills: 1 | Status: SHIPPED | OUTPATIENT
Start: 2023-03-14

## 2023-03-14 RX ORDER — PSEUDOEPHEDRINE HCL 30 MG
100 TABLET ORAL 2 TIMES DAILY PRN
Qty: 60 CAPSULE | Refills: 1 | Status: SHIPPED | OUTPATIENT
Start: 2023-03-14

## 2023-03-14 RX ADMIN — PRENATAL VITAMINS-IRON FUMARATE 27 MG IRON-FOLIC ACID 0.8 MG TABLET 1 TABLET: at 08:40

## 2023-03-14 RX ADMIN — DOCUSATE SODIUM 100 MG: 100 CAPSULE, LIQUID FILLED ORAL at 08:40

## 2023-03-14 NOTE — DISCHARGE SUMMARY
Discharge Summary     Deborah Obrien  : 2000  MRN: [id number]  CSN: [id number]    Date of Admission: 3/12/2023   Date of Discharge:  3/14/2023   Delivering Physician: Winsome Quintero        Admission Diagnosis: 1. Normal labor [O80, Z37.9]   Discharge Diagnosis: 1. Same as above plus  2. Pregnancy at 39w2d - delivered       Procedures: 3/12/2023  - Vaginal, Spontaneous       Hospital Course  Patient is a 22 y.o.  who at 39w2d had a vaginal birth.  Her postpartum course was without complications.  On PPD #2 she was ready for discharge.  She had normal lochia and pain well controlled with oral medications.    Infant  male  fetus weighing 3675 g (8 lb 1.6 oz)   Apgars -  8 @ 1 minute /  9 @ 5 minutes.    Discharge labs  Lab Results   Component Value Date    WBC 14.80 (H) 2023    HGB 11.3 (L) 2023    HCT 35.8 2023     2023       Discharge Medications     Discharge Medications      New Medications      Instructions Start Date   benzocaine-menthol 20-0.5 % aerosol topical spray  Commonly known as: DERMOPLAST   Topical, 3 Times Daily PRN      docusate sodium 100 MG capsule   100 mg, Oral, 2 Times Daily PRN      ibuprofen 600 MG tablet  Commonly known as: ADVIL,MOTRIN   600 mg, Oral, Every 6 Hours PRN      witch hazel-glycerin pad  Commonly known as: TUCKS   1 pad, Topical, 3 Times Daily PRN         Continue These Medications      Instructions Start Date   ferrous sulfate 325 (65 FE) MG tablet   325 mg, Oral, Daily With Breakfast      PRENATAL VITAMINS PO   Oral             Discharge Disposition Home or Self Care   Condition on Discharge: good   Follow-up: 6 weeks with Dr. Leon Quintero MD  3/14/2023

## 2023-03-14 NOTE — PROGRESS NOTES
CORTNEY Deborah Obrien  : 2000  MRN: [id number]  CSN: [id number]    Postpartum Day #2  CC: routine postpartum care   Subjective   Her pain is well controlled.  Vaginal bleeding is less than a normal period. She is ready to go home today.      Objective     Min/max vitals past 24 hours:   Temp  Min: 98.3 °F (36.8 °C)  Max: 98.6 °F (37 °C)  BP  Min: 93/53  Max: 118/81  Pulse  Min: 75  Max: 97  Resp  Min: 16  Max: 18        General: well developed; well nourished  no acute distress   Abdomen: fundus firm and non-tender   Pelvic: Not performed   Ext: Calves NT     Results from last 7 days   Lab Units 23  0600 23  0542   WBC 10*3/mm3 14.80* 9.40   HEMOGLOBIN g/dL 11.3* 12.7   HEMATOCRIT % 35.8 39.7   PLATELETS 10*3/mm3 308 317     Lab Results   Component Value Date    RH Positive 2023    HEPBSAG Negative 2022        Assessment   1. Postpartum Day #2 S/P vaginal delivery  2. Doing well     Plan   1. Discharge to home  2. Advised no tampons or intercourse for 6 weeks.  3. D/C questions all answered  4. Follow-up appointment in 6 week(s)    Winsome Quintero MD  3/14/2023  08:11 EDT

## 2023-03-14 NOTE — LACTATION NOTE
03/14/23 1045   Maternal Information   Date of Referral 03/14/23   Person Making Referral lactation consultant   Maternal Reason for Referral breastfeeding currently;no prior breastfeeding experience  (Mom ready to attempt nursing at breast.  Infant spitty, gassy, irritated at breast.  Took 20+ minutes to achieve a good latch.  Attempted with and without shield.  Latch was finally achieved and maintained in FB hold on left breast. Encouraged mom to)   Infant Reason for Referral other (see comments)  (attempt nursing infant q 3 hours and pump for 15 minutes following nursing/nursing attempts.  Encouraged outpatient clinic after milk is in.)   Maternal Assessment   Breast Size Issue none   Breast Shape Bilateral:;round   Breast Density Bilateral:;soft   Nipples Bilateral:;everted;short;graspable   Left Nipple Symptoms intact;nontender   Right Nipple Symptoms intact;nontender   Maternal Infant Feeding   Maternal Emotional State receptive;relaxed   Infant Positioning clutch/football   Signs of Milk Transfer audible swallow   Pain with Feeding no   Comfort Measures Before/During Feeding infant position adjusted;latch adjusted;maternal position adjusted   Nipple Shape After Feeding, Left Breast round;symmetrical;appropriately projected   Latch Assistance full assistance needed;verbal guidance offered

## 2023-03-17 ENCOUNTER — TELEPHONE (OUTPATIENT)
Dept: OBSTETRICS AND GYNECOLOGY | Facility: CLINIC | Age: 23
End: 2023-03-17
Payer: COMMERCIAL

## 2023-03-17 NOTE — TELEPHONE ENCOUNTER
She is bleeding changing a pad every 2-3 hours but not really saturated.  Minimal pain.  Was told this was pretty much normal but if she starts bleeding and having to change a saturated pad hourly and passes another large clot like this to call or go to ER

## 2023-03-17 NOTE — TELEPHONE ENCOUNTER
DR. CARDONA     Patient called. She delivered on 03/12 and was discharged on 03/14. She has a clot the size of an egg. She is walking more at home than in the hospital. She’s not dizzy but she is concern about the blood. Please advise.

## 2023-03-28 ENCOUNTER — HOSPITAL ENCOUNTER (OUTPATIENT)
Dept: LACTATION | Facility: HOSPITAL | Age: 23
Discharge: HOME | End: 2023-03-28

## 2023-03-28 NOTE — LACTATION NOTE
03/28/23 1330   Maternal Information   Date of Referral 03/22/23   Person Making Referral patient   Maternal Reason for Referral latch not attained  (Mom has been pumping and supplementing only.  Has not been able to get baby to latch.)   Infant Reason for Referral disinterest in latch;no latch achieved   Maternal Assessment   Breast Size Issue none   Breast Shape Bilateral:;round   Breast Density Bilateral:;soft;other (see comments)  (Mom does have knotty areas in her breasts.  Showed her how to massage these knots out.)   Nipples Bilateral:;short  (A nipple shield is needed because Mom's nipples are short and baby has been feeding from a bottle.)   Left Nipple Symptoms intact;nontender   Right Nipple Symptoms intact;nontender   Maternal Infant Feeding   Maternal Emotional State anxious;receptive  (Mom is anxious about not being able to get baby to latch.  She said she would like to exclusively breast feed.)   Infant Positioning clutch/football   Signs of Milk Transfer audible swallow;deep jaw excursions noted;suck/swallow ratio;transfer present   Pain with Feeding no   Comfort Measures Before/During Feeding infant position adjusted;maternal position adjusted   Nipple Shape After Feeding, Left Breast appropriately projected   Nipple Shape After Feeding, Right appropriately projected   Latch Assistance minimal assistance   Support Person Involvement actively supporting mother   Feeding Infant   Feeding Readiness Cues eager;energy for feeding   Satiety Cues decreased number of sucks;other (see comments)  (It appeared baby transferred all of milk that was readily transferrable.  He continued to suck but not much swallowing.)   Feeding Tolerance/Success adequate pause for breath;alert for feeding;coordinated suck/swallow/breathing;eager;feeding retained;strong suck;sustained alertness;sustained suck   Effective Latch During Feeding yes   Suck/Swallow/Breathing Coordination present   Prefeeding Weight (gm) 3970 g  (140 oz)   Postfeeding Weight (gm) 4015 g (141.6 oz)   Weight Gain/Loss (gm)  45 g (1.6 oz)   Breastfeeding Session   Breastfeeding breastfeeding, bilateral   Breastfeeding Time, Left (min) 10   Breastfeeding Time, Right (min) 9   LATCH Score   Latch 2-->grasps breast, tongue down, lips flanged, rhythmic sucking   Audible Swallowing 2-->spontaneous and intermittent (24 hrs old)   Type of Nipple 2-->everted (after stimulation)   Comfort (Breast/Nipple) 2-->soft/nontender   Hold (Positioning) 1-->minimal assist, teach one side, mother does other, staff holds   Latch Score 9   Milk Expression/Equipment   Breast Pump Type double electric, personal   Breast Pumping   Breast Pumping Interventions other (see comments)  (recommended pumping and bottle feeding, only, at night and pump after breastfeeding during the day)     Reason for visit:  Mom has been pumping and bottle feeding only.  She said baby gets very frustrated and irritable when she tries to latch him.  She said she hasn't been pumping like she should and sometimes goes 4-5 hours between pumping sessions.  She said her pumped milk volume has dropped to about 1.5 to 2 ounces.  At the baby's current weight, he should be taking 2-3 ounces per feeding, depending on how long he goes between feeding.    Assessment: Baby was able to latch and nurse well with a nipple shield.  He nursed vigorously for 10 minutes on each side, and it seemed like the milk flow slowed after that.  He was able to transfer 1.5 ounces total (comparable to Mom's pumped volume).    Plan of care:  Baby can nurse well.  However, Mom's milk volume is less than he needs at this time.  It was suggested that mom pump and bottle feed, only, at night.  During the day she should breast feed followed by pumping and supplementing. Hopefully, she will see her pumped milk volume going up, especially at night when she pumps only.  She was also advised that as her milk volume picks up, baby will probably  start to refuse as much supplement.  Mom was encouraged to call for a follow-up appointment, if needed.

## 2023-04-24 ENCOUNTER — POSTPARTUM VISIT (OUTPATIENT)
Dept: OBSTETRICS AND GYNECOLOGY | Facility: CLINIC | Age: 23
End: 2023-04-24
Payer: COMMERCIAL

## 2023-04-24 VITALS — DIASTOLIC BLOOD PRESSURE: 86 MMHG | WEIGHT: 211 LBS | BODY MASS INDEX: 37.38 KG/M2 | SYSTOLIC BLOOD PRESSURE: 122 MMHG

## 2023-04-24 PROCEDURE — 0503F POSTPARTUM CARE VISIT: CPT | Performed by: OBSTETRICS & GYNECOLOGY

## 2023-04-24 NOTE — PROGRESS NOTES
Subjective   Chief Complaint   Patient presents with   • Postpartum Care     6w1d PP vaginal delivery     Viry Obrien is a 22 y.o. year old  presenting to be seen for her postpartum visit.  She had a vaginal delivery.  Her son is doing well.    Since delivery she has not been sexually active.  She does not have concerns about post-partum blues/depression.   Taylor Score = 1  She is bottle feeding.  For ongoing contraception, her plans are undecided.    The following portions of the patient's history were reviewed and updated as appropriate:current medications and allergies    Social History    Tobacco Use      Smoking status: Never      Smokeless tobacco: Never      Review of Systems  Constitutional POS: nothing reported    NEG: anorexia or night sweats   Genitourinary POS: nothing reported    NEG: dysuria or hematuria      Gastointestinal POS: nothing reported    NEG: bloating, change in bowel habits, melena or reflux symptoms   Breast POS: nothing reported    NEG: persistent breast lump, skin dimpling or nipple discharge        Objective   /86 (BP Location: Left arm, Patient Position: Sitting, Cuff Size: Large Adult)   Wt 95.7 kg (211 lb)   LMP 06/10/2022   Breastfeeding No   BMI 37.38 kg/m²     General: well developed; well nourished  no acute distress   Skin: No suspicious lesions seen   Thyroid: normal to inspection and palpation   Heart:  Not performed.   Lungs: breathing is unlabored   Breasts:  Examined in supine position  Symmetric without masses or skin dimpling  Nipples normal without inversion, lesions or discharge  There are no palpable axillary nodes   Abdomen: soft, non-tender; no masses  no umbilical or inguinal hernias are present  no hepato-splenomegaly   Pelvis: Clinical staff was present for exam  External genitalia:  normal appearance of the external genitalia including Bartholin's and [employer]'s glands.  :  urethral meatus normal;  Vaginal:  normal pink mucosa without  prolapse or lesions.  Cervix:  normal appearance.  Uterus:  normal size, shape and consistency.  Adnexa:  normal bimanual exam of the adnexa.  Rectal:  digital rectal exam not performed; anus visually normal appearing.        Assessment   1. Normal 6 week postpartum exam     Plan   1. BC options reviewed and compared today: Depo-Provera, IUD - Mirena, IUD - Paraguard, Nexplanon, NuvaRing, OCP (estrogen/progesterone) and Ortho-Evra.  She desires to use condoms.  Reviewed proper interval spacing of pregnancy of 18 months.  2. Pap was not done today.  I explained to Viry that the recommendations for Pap smear interval in a low risk patient has lengthened to 3 years time.  I told Viry she still needs to be seen in our office yearly for a full physical including breast and pelvic exam.  3. The importance of keeping all planned follow-up and taking all medications as prescribed was emphasized.  4. Follow up for annual exam in ~ one year    No orders of the defined types were placed in this encounter.         This note was electronically signed.    Winsome Quintero MD  April 24, 2023

## 2023-05-15 ENCOUNTER — HOSPITAL ENCOUNTER (INPATIENT)
Facility: HOSPITAL | Age: 23
LOS: 2 days | Discharge: HOME | End: 2023-05-18
Attending: EMERGENCY MEDICINE | Admitting: HOSPITALIST
Payer: COMMERCIAL

## 2023-05-15 ENCOUNTER — APPOINTMENT (OUTPATIENT)
Dept: CT IMAGING | Facility: HOSPITAL | Age: 23
End: 2023-05-15
Payer: COMMERCIAL

## 2023-05-15 DIAGNOSIS — Z90.49 STATUS POST LAPAROSCOPIC CHOLECYSTECTOMY: ICD-10-CM

## 2023-05-15 DIAGNOSIS — K85.10 ACUTE GALLSTONE PANCREATITIS: Primary | ICD-10-CM

## 2023-05-15 LAB
ALBUMIN SERPL-MCNC: 4.4 G/DL (ref 3.5–5.2)
ALBUMIN/GLOB SERPL: 1.6 G/DL
ALP SERPL-CCNC: 221 U/L (ref 39–117)
ALT SERPL W P-5'-P-CCNC: 578 U/L (ref 1–33)
ANION GAP SERPL CALCULATED.3IONS-SCNC: 11.7 MMOL/L (ref 5–15)
ANION GAP SERPL CALCULATED.3IONS-SCNC: 11.7 MMOL/L (ref 5–15)
AST SERPL-CCNC: 850 U/L (ref 1–32)
B-HCG UR QL: NEGATIVE
BASOPHILS # BLD AUTO: 0.02 10*3/MM3 (ref 0–0.2)
BASOPHILS NFR BLD AUTO: 0.1 % (ref 0–1.5)
BILIRUB SERPL-MCNC: 1.8 MG/DL (ref 0–1.2)
BILIRUB UR QL STRIP: ABNORMAL
BUN SERPL-MCNC: 11 MG/DL (ref 6–20)
BUN SERPL-MCNC: 11 MG/DL (ref 6–20)
BUN/CREAT SERPL: 16.4 (ref 7–25)
BUN/CREAT SERPL: 16.4 (ref 7–25)
CALCIUM SPEC-SCNC: 9.4 MG/DL (ref 8.6–10.5)
CALCIUM SPEC-SCNC: 9.4 MG/DL (ref 8.6–10.5)
CHLORIDE SERPL-SCNC: 103 MMOL/L (ref 98–107)
CHLORIDE SERPL-SCNC: 103 MMOL/L (ref 98–107)
CLARITY UR: CLEAR
CO2 SERPL-SCNC: 22.3 MMOL/L (ref 22–29)
CO2 SERPL-SCNC: 22.3 MMOL/L (ref 22–29)
COLOR UR: YELLOW
CREAT SERPL-MCNC: 0.67 MG/DL (ref 0.57–1)
CREAT SERPL-MCNC: 0.67 MG/DL (ref 0.57–1)
D-LACTATE SERPL-SCNC: 0.9 MMOL/L (ref 0.5–2)
DEPRECATED RDW RBC AUTO: 44.2 FL (ref 37–54)
EGFRCR SERPLBLD CKD-EPI 2021: 125.3 ML/MIN/1.73
EGFRCR SERPLBLD CKD-EPI 2021: 125.3 ML/MIN/1.73
EOSINOPHIL # BLD AUTO: 0.04 10*3/MM3 (ref 0–0.4)
EOSINOPHIL NFR BLD AUTO: 0.3 % (ref 0.3–6.2)
ERYTHROCYTE [DISTWIDTH] IN BLOOD BY AUTOMATED COUNT: 14.8 % (ref 12.3–15.4)
GLOBULIN UR ELPH-MCNC: 2.8 GM/DL
GLUCOSE SERPL-MCNC: 115 MG/DL (ref 65–99)
GLUCOSE SERPL-MCNC: 115 MG/DL (ref 65–99)
GLUCOSE UR STRIP-MCNC: NEGATIVE MG/DL
HCT VFR BLD AUTO: 44.3 % (ref 34–46.6)
HGB BLD-MCNC: 14.6 G/DL (ref 12–15.9)
HGB UR QL STRIP.AUTO: ABNORMAL
IMM GRANULOCYTES # BLD AUTO: 0.02 10*3/MM3 (ref 0–0.05)
IMM GRANULOCYTES NFR BLD AUTO: 0.1 % (ref 0–0.5)
KETONES UR QL STRIP: NEGATIVE
LEUKOCYTE ESTERASE UR QL STRIP.AUTO: NEGATIVE
LIPASE SERPL-CCNC: >3000 U/L (ref 13–60)
LYMPHOCYTES # BLD AUTO: 1.07 10*3/MM3 (ref 0.7–3.1)
LYMPHOCYTES NFR BLD AUTO: 7.9 % (ref 19.6–45.3)
MCH RBC QN AUTO: 26.7 PG (ref 26.6–33)
MCHC RBC AUTO-ENTMCNC: 33 G/DL (ref 31.5–35.7)
MCV RBC AUTO: 81.1 FL (ref 79–97)
MONOCYTES # BLD AUTO: 0.62 10*3/MM3 (ref 0.1–0.9)
MONOCYTES NFR BLD AUTO: 4.6 % (ref 5–12)
NEUTROPHILS NFR BLD AUTO: 11.78 10*3/MM3 (ref 1.7–7)
NEUTROPHILS NFR BLD AUTO: 87 % (ref 42.7–76)
NITRITE UR QL STRIP: NEGATIVE
PH UR STRIP.AUTO: 6.5 [PH] (ref 5–8)
PLATELET # BLD AUTO: 391 10*3/MM3 (ref 140–450)
PMV BLD AUTO: 9.2 FL (ref 6–12)
POTASSIUM SERPL-SCNC: 3.8 MMOL/L (ref 3.5–5.2)
POTASSIUM SERPL-SCNC: 3.8 MMOL/L (ref 3.5–5.2)
PROT SERPL-MCNC: 7.2 G/DL (ref 6–8.5)
PROT UR QL STRIP: ABNORMAL
RBC # BLD AUTO: 5.46 10*6/MM3 (ref 3.77–5.28)
SODIUM SERPL-SCNC: 137 MMOL/L (ref 136–145)
SODIUM SERPL-SCNC: 137 MMOL/L (ref 136–145)
SP GR UR STRIP: 1.02 (ref 1–1.03)
UROBILINOGEN UR QL STRIP: ABNORMAL
WBC NRBC COR # BLD: 13.55 10*3/MM3 (ref 3.4–10.8)

## 2023-05-15 PROCEDURE — 25010000002 ONDANSETRON PER 1 MG: Performed by: EMERGENCY MEDICINE

## 2023-05-15 PROCEDURE — 83690 ASSAY OF LIPASE: CPT | Performed by: EMERGENCY MEDICINE

## 2023-05-15 PROCEDURE — 25510000001 IOPAMIDOL PER 1 ML: Performed by: EMERGENCY MEDICINE

## 2023-05-15 PROCEDURE — 25010000002 MORPHINE PER 10 MG: Performed by: EMERGENCY MEDICINE

## 2023-05-15 PROCEDURE — 81025 URINE PREGNANCY TEST: CPT | Performed by: EMERGENCY MEDICINE

## 2023-05-15 PROCEDURE — 80053 COMPREHEN METABOLIC PANEL: CPT | Performed by: EMERGENCY MEDICINE

## 2023-05-15 PROCEDURE — 85025 COMPLETE CBC W/AUTO DIFF WBC: CPT | Performed by: EMERGENCY MEDICINE

## 2023-05-15 PROCEDURE — 81003 URINALYSIS AUTO W/O SCOPE: CPT | Performed by: EMERGENCY MEDICINE

## 2023-05-15 PROCEDURE — 83605 ASSAY OF LACTIC ACID: CPT | Performed by: EMERGENCY MEDICINE

## 2023-05-15 PROCEDURE — 99285 EMERGENCY DEPT VISIT HI MDM: CPT

## 2023-05-15 PROCEDURE — 74177 CT ABD & PELVIS W/CONTRAST: CPT

## 2023-05-15 PROCEDURE — 25010000002 CEFTRIAXONE PER 250 MG: Performed by: EMERGENCY MEDICINE

## 2023-05-15 RX ORDER — MORPHINE SULFATE 2 MG/ML
4 INJECTION, SOLUTION INTRAMUSCULAR; INTRAVENOUS ONCE
Status: COMPLETED | OUTPATIENT
Start: 2023-05-15 | End: 2023-05-15

## 2023-05-15 RX ORDER — ONDANSETRON 2 MG/ML
4 INJECTION INTRAMUSCULAR; INTRAVENOUS ONCE
Status: COMPLETED | OUTPATIENT
Start: 2023-05-16 | End: 2023-05-15

## 2023-05-15 RX ORDER — ONDANSETRON 4 MG/1
4 TABLET, FILM COATED ORAL EVERY 4 HOURS PRN
Status: DISCONTINUED | OUTPATIENT
Start: 2023-05-15 | End: 2023-05-18 | Stop reason: HOSPADM

## 2023-05-15 RX ORDER — MORPHINE SULFATE 2 MG/ML
2 INJECTION, SOLUTION INTRAMUSCULAR; INTRAVENOUS
Status: DISCONTINUED | OUTPATIENT
Start: 2023-05-15 | End: 2023-05-18 | Stop reason: HOSPADM

## 2023-05-15 RX ORDER — FAMOTIDINE 10 MG/ML
20 INJECTION, SOLUTION INTRAVENOUS EVERY 12 HOURS
Status: DISCONTINUED | OUTPATIENT
Start: 2023-05-16 | End: 2023-05-18 | Stop reason: HOSPADM

## 2023-05-15 RX ORDER — ONDANSETRON 2 MG/ML
4 INJECTION INTRAMUSCULAR; INTRAVENOUS ONCE
Status: COMPLETED | OUTPATIENT
Start: 2023-05-15 | End: 2023-05-15

## 2023-05-15 RX ORDER — ONDANSETRON 2 MG/ML
4 INJECTION INTRAMUSCULAR; INTRAVENOUS EVERY 6 HOURS PRN
Status: DISCONTINUED | OUTPATIENT
Start: 2023-05-15 | End: 2023-05-18 | Stop reason: HOSPADM

## 2023-05-15 RX ORDER — SODIUM CHLORIDE 9 MG/ML
50 INJECTION, SOLUTION INTRAVENOUS CONTINUOUS
Status: DISCONTINUED | OUTPATIENT
Start: 2023-05-15 | End: 2023-05-18 | Stop reason: HOSPADM

## 2023-05-15 RX ORDER — SODIUM CHLORIDE 0.9 % (FLUSH) 0.9 %
10 SYRINGE (ML) INJECTION AS NEEDED
Status: DISCONTINUED | OUTPATIENT
Start: 2023-05-15 | End: 2023-05-18 | Stop reason: HOSPADM

## 2023-05-15 RX ADMIN — ONDANSETRON 4 MG: 2 INJECTION INTRAMUSCULAR; INTRAVENOUS at 20:20

## 2023-05-15 RX ADMIN — FAMOTIDINE 20 MG: 10 INJECTION INTRAVENOUS at 23:05

## 2023-05-15 RX ADMIN — MORPHINE SULFATE 4 MG: 2 INJECTION, SOLUTION INTRAMUSCULAR; INTRAVENOUS at 20:20

## 2023-05-15 RX ADMIN — SODIUM CHLORIDE 250 ML/HR: 9 INJECTION, SOLUTION INTRAVENOUS at 23:01

## 2023-05-15 RX ADMIN — IOPAMIDOL 100 ML: 755 INJECTION, SOLUTION INTRAVENOUS at 20:39

## 2023-05-15 RX ADMIN — MORPHINE SULFATE 4 MG: 2 INJECTION, SOLUTION INTRAMUSCULAR; INTRAVENOUS at 23:00

## 2023-05-15 RX ADMIN — ONDANSETRON 4 MG: 2 INJECTION INTRAMUSCULAR; INTRAVENOUS at 23:05

## 2023-05-15 RX ADMIN — CEFTRIAXONE SODIUM 1 G: 1 INJECTION, POWDER, FOR SOLUTION INTRAMUSCULAR; INTRAVENOUS at 21:39

## 2023-05-16 PROBLEM — R74.01 TRANSAMINITIS: Status: ACTIVE | Noted: 2023-05-16

## 2023-05-16 PROBLEM — D72.829 ELEVATED WHITE BLOOD CELL COUNT: Status: ACTIVE | Noted: 2023-05-16

## 2023-05-16 LAB
ALBUMIN SERPL-MCNC: 3.9 G/DL (ref 3.5–5.2)
ALBUMIN/GLOB SERPL: 1.5 G/DL
ALP SERPL-CCNC: 206 U/L (ref 39–117)
ALT SERPL W P-5'-P-CCNC: 546 U/L (ref 1–33)
ANION GAP SERPL CALCULATED.3IONS-SCNC: 8.5 MMOL/L (ref 5–15)
AST SERPL-CCNC: 462 U/L (ref 1–32)
BILIRUB SERPL-MCNC: 1 MG/DL (ref 0–1.2)
BUN SERPL-MCNC: 9 MG/DL (ref 6–20)
BUN/CREAT SERPL: 15.5 (ref 7–25)
CALCIUM SPEC-SCNC: 8.8 MG/DL (ref 8.6–10.5)
CHLORIDE SERPL-SCNC: 108 MMOL/L (ref 98–107)
CO2 SERPL-SCNC: 23.5 MMOL/L (ref 22–29)
CREAT SERPL-MCNC: 0.58 MG/DL (ref 0.57–1)
DEPRECATED RDW RBC AUTO: 45.2 FL (ref 37–54)
EGFRCR SERPLBLD CKD-EPI 2021: 129.8 ML/MIN/1.73
ERYTHROCYTE [DISTWIDTH] IN BLOOD BY AUTOMATED COUNT: 15.1 % (ref 12.3–15.4)
GLOBULIN UR ELPH-MCNC: 2.6 GM/DL
GLUCOSE SERPL-MCNC: 115 MG/DL (ref 65–99)
HCT VFR BLD AUTO: 42.1 % (ref 34–46.6)
HGB BLD-MCNC: 13.9 G/DL (ref 12–15.9)
INR PPP: 1.04 (ref 0.9–1.1)
LIPASE SERPL-CCNC: 1299 U/L (ref 13–60)
MCH RBC QN AUTO: 27 PG (ref 26.6–33)
MCHC RBC AUTO-ENTMCNC: 33 G/DL (ref 31.5–35.7)
MCV RBC AUTO: 81.9 FL (ref 79–97)
PLATELET # BLD AUTO: 356 10*3/MM3 (ref 140–450)
PMV BLD AUTO: 9.1 FL (ref 6–12)
POTASSIUM SERPL-SCNC: 4.1 MMOL/L (ref 3.5–5.2)
PROT SERPL-MCNC: 6.5 G/DL (ref 6–8.5)
PROTHROMBIN TIME: 13.7 SECONDS (ref 11.7–14.2)
RBC # BLD AUTO: 5.14 10*6/MM3 (ref 3.77–5.28)
SODIUM SERPL-SCNC: 140 MMOL/L (ref 136–145)
WBC NRBC COR # BLD: 11.02 10*3/MM3 (ref 3.4–10.8)

## 2023-05-16 PROCEDURE — 85027 COMPLETE CBC AUTOMATED: CPT | Performed by: NURSE PRACTITIONER

## 2023-05-16 PROCEDURE — 25010000002 ONDANSETRON PER 1 MG: Performed by: NURSE PRACTITIONER

## 2023-05-16 PROCEDURE — 99222 1ST HOSP IP/OBS MODERATE 55: CPT | Performed by: PHYSICIAN ASSISTANT

## 2023-05-16 PROCEDURE — 99221 1ST HOSP IP/OBS SF/LOW 40: CPT | Performed by: SURGERY

## 2023-05-16 PROCEDURE — 83690 ASSAY OF LIPASE: CPT | Performed by: NURSE PRACTITIONER

## 2023-05-16 PROCEDURE — 80053 COMPREHEN METABOLIC PANEL: CPT | Performed by: NURSE PRACTITIONER

## 2023-05-16 PROCEDURE — 25010000002 MORPHINE PER 10 MG: Performed by: NURSE PRACTITIONER

## 2023-05-16 PROCEDURE — 85610 PROTHROMBIN TIME: CPT | Performed by: NURSE PRACTITIONER

## 2023-05-16 RX ADMIN — SODIUM CHLORIDE 125 ML/HR: 9 INJECTION, SOLUTION INTRAVENOUS at 21:49

## 2023-05-16 RX ADMIN — MORPHINE SULFATE 2 MG: 2 INJECTION, SOLUTION INTRAMUSCULAR; INTRAVENOUS at 10:32

## 2023-05-16 RX ADMIN — ONDANSETRON 4 MG: 2 INJECTION INTRAMUSCULAR; INTRAVENOUS at 21:56

## 2023-05-16 RX ADMIN — MORPHINE SULFATE 2 MG: 2 INJECTION, SOLUTION INTRAMUSCULAR; INTRAVENOUS at 21:56

## 2023-05-16 RX ADMIN — MORPHINE SULFATE 2 MG: 2 INJECTION, SOLUTION INTRAMUSCULAR; INTRAVENOUS at 13:45

## 2023-05-16 RX ADMIN — FAMOTIDINE 20 MG: 10 INJECTION INTRAVENOUS at 13:15

## 2023-05-16 NOTE — PAYOR COMM NOTE
"Alan Colunga (22 y.o. Female)     PLEASE SEE ATTACHED FOR INPT AUTH. - PT WAS IN A FSED AND THEN TRANSFERRED HERE ON  5/16/23.     REF#[id number]    PLEASE CALL  OR  [phone]    THANK YOU    JOJO JARAMILLO LPN [facility]    Date of Birth   2000    Social Security Number       Address   [id number] [place] [address]    Home Phone   [phone]    MRN   [id number]       Jew   None    Marital Status   Single                            Admission Date   5/15/23    Admission Type   Urgent    Admitting Provider   Rehan Goldberg MD    Attending Provider   Rehan Goldberg MD    Department, Room/Bed   [address], 82/1       Discharge Date       Discharge Disposition       Discharge Destination                               Attending Provider: Rehan Goldberg MD    Allergies: No Known Allergies    Isolation: None   Infection: None   Code Status: CPR    Ht: 160 cm (63\")   Wt: 90.7 kg (200 lb)    Admission Cmt: None   Principal Problem: Acute gallstone pancreatitis [K85.10]                 Active Insurance as of 5/15/2023     Primary Coverage     Payor Plan Insurance Group Employer/Plan Group    [employer]HE[employer] BLUE CROSS AN[employer]M BLUE CROSS BLUE SHIELD PPO [id number]     Payor Plan Address Payor Plan Phone Number Payor Plan Fax Number Effective Dates    [address] [phone]  9/26/2022 - None Entered    [facility] [id number]       Subscriber Name Subscriber Birth Date Member ID       ALAN COLUNGA 2000 [id number]                 Emergency Contacts      (Rel.) Home Phone Work Phone Mobile Phone    BRUTON,XYREN (Significant Other) [phone] -- [phone]            [place]: NPI [id number]  Tax ID [id number]  History & Physical    No notes of this type exist for this encounter.            Emergency Department Notes      Haley Teague RN at 05/16/23 0704        Call placed to  EMS at this time to notify of need for transport " to  [facility]. Message left. Awaiting return call.    Electronically signed by Haley Teague RN at 05/16/23 0705     Haley Teague RN at 05/16/23 0710        Received return call from David with  EMS. EMS ETA 0900.    Electronically signed by Haley Teague RN at 05/16/23 0711     Una Miller, RN at 05/16/23 0728        Pt notified of bed availability at [facility] and call for transport. Pt requests to go via private vehicle. Discussed with Dr Chase, who agreed. Pt notified and she states her boyfriend will come to transport her    Electronically signed by Una Miller, RN at 05/16/23 0730       Oxygen Therapy (since admission)     Date/Time SpO2 Device (Oxygen Therapy) Flow (L/min) Oxygen Concentration (%) ETCO2 (mmHg)    05/16/23 1413 98 room air -- -- --    05/16/23 0843 97 room air -- -- --    05/16/23 0720 96 -- -- -- --    05/16/23 0700 94 -- -- -- --    05/16/23 0616 96 -- -- -- --    05/16/23 0600 96 -- -- -- --    05/16/23 0500 98 -- -- -- --    05/16/23 0420 98 -- -- -- --    05/16/23 0300 98 -- -- -- --    05/16/23 0244 99 -- -- -- --    05/15/23 1927 100 room air -- -- --        Intake & Output (last 2 days)       05/14 0701  05/15 0700 05/15 0701  05/16 0700 05/16 0701  05/17 0700    P.O.   600    I.V. (mL/kg)  1000 (11)     IV Piggyback  100     Total Intake(mL/kg)  1100 (12.1) 600 (6.6)    Net  +1100 +600           Urine Unmeasured Occurrence   1 x        Lines, Drains & Airways     Active LDAs     Name Placement date Placement time Site Days    Peripheral IV 05/15/23 1930 Right Antecubital 05/15/23  1930  Antecubital  less than 1                       Consult Notes (all)      Marion Medrano PA-C at 05/16/23 1246      Consult Orders    1. Inpatient Gastroenterology Consult [[id number]] ordered by Abdoulaye Betts APRN at 05/15/23 2307               [facility]  Initial Inpatient Consult Note    Referring Provider: A     Reason for Consultation:  Gallstone pancreatitis    Subjective     History of present illness:      Thank you for allowing us to participate in the care of this patient.  22 y.o. female unknown our service presented to the hospital with severe epigastric pain radiating to her chest that started 0500 5/15 and progressively worsened.  She reports a history of intermittent right upper quadrant pain since March after she gave birth to her son.  Radiates to her chest.  Associated with nausea, occasional vomiting.  Worse with p.o. intake.  She denies melena hematochezia, diarrhea, constipation, coffee-ground emesis, hematemesis, fever, shortness of air.    5/15 Labs showed elevated alkaline phosphatase at 221, , , bilirubin 1.8.  Lipase > 3000, WBC 13.55.  All of these labs are improving.  Contrasted CT scan with acute pancreatitis with suspected cholelithiasis, possible gallstone pancreatitis.      5/16 labs CBC with white blood cell count 11.02 today, INR normal, lipase 1299, , ,     Denies family history of colon cancer.  Maternal grandmother with breast cancer.  She does not use tobacco products, denies alcohol or drug use.  No significant medical history other than recent vaginal birth.      Past Medical History:  Past Medical History:   Diagnosis Date   • Iron deficiency anemia during pregnancy 01/26/2023     Past Surgical History:  Past Surgical History:   Procedure Laterality Date   • WISDOM TOOTH EXTRACTION        Social History:   Social History     Tobacco Use   • Smoking status: Never   • Smokeless tobacco: Never   Substance Use Topics   • Alcohol use: Not Currently      Family History:  Family History   Problem Relation Age of Onset   • No Known Problems Mother    • Hyperlipidemia Father    • Breast cancer Maternal Grandmother    • Ovarian cancer Neg Hx    • Colon cancer Neg Hx    • Osteoporosis Neg Hx    • Pancreatic cancer Neg Hx        Home Meds:  Medications Prior to Admission   Medication Sig  Dispense Refill Last Dose   • ibuprofen (ADVIL,MOTRIN) 600 MG tablet Take 1 tablet by mouth Every 6 (Six) Hours As Needed for Mild Pain (First Line: Mild pain.). 60 tablet 1      Current Meds:   cefTRIAXone, 1 g, Intravenous, Q24H  famotidine, 20 mg, Intravenous, Q12H      Allergies:  No Known Allergies  Review of Systems  Negative except as noted in the HPI    Objective     Vital Signs  Temp:  [97.3 °F (36.3 °C)-98.7 °F (37.1 °C)] 98.4 °F (36.9 °C)  Heart Rate:  [50-93] 74  Resp:  [16-18] 18  BP: (101-132)/(63-87) 109/73  Physical Exam:   Constitutional:    Alert, cooperative, in no acute distress    Eyes:          conjunctivae and sclerae normal, no icterus    HENT:   Atraumatic, normal hearing, mucosa moist    Lungs:     normal respiratory effort    Abdomen:     Soft, nondistended, diffuse upper abdominal tenderness; normal bowel sounds , no organomegaly    Extremities:   no edema or erythema of bilateral lower extremities    Skin:   No bruising, rash, or pallor   Neurologic:  No tremors, no asterixis    Psychiatric:  normal mood and affect; A&O x3     Results Review:   I reviewed the patient's new clinical results.    Results from last 7 days   Lab Units 05/16/23  0620 05/15/23  1937   WBC 10*3/mm3 11.02* 13.55*   HEMOGLOBIN g/dL 13.9 14.6   HEMATOCRIT % 42.1 44.3   PLATELETS 10*3/mm3 356 391     Results from last 7 days   Lab Units 05/16/23  0620 05/15/23  1937   SODIUM mmol/L 140 137  137   POTASSIUM mmol/L 4.1 3.8  3.8   CHLORIDE mmol/L 108* 103  103   CO2 mmol/L 23.5 22.3  22.3   BUN mg/dL 9 11  11   CREATININE mg/dL 0.58 0.67  0.67   CALCIUM mg/dL 8.8 9.4  9.4   BILIRUBIN mg/dL 1.0 1.8*   ALK PHOS U/L 206* 221*   ALT (SGPT) U/L 546* 578*   AST (SGOT) U/L 462* 850*   GLUCOSE mg/dL 115* 115*  115*     Results from last 7 days   Lab Units 05/16/23  0620   INR  1.04     Lab Results   Lab Value Date/Time    LIPASE 1,299 (H) 05/16/2023 0620    LIPASE >3,000 (H) 05/15/2023 1937       Radiology:  CT Abdomen  Pelvis With Contrast   Final Result       1.  Acute pancreatitis without a discrete or drainable fluid collection.   2.  Suspected cholelithiasis. Given the presence of concurrent   pancreatitis, further evaluation with right upper quadrant ultrasound or   ERCP/MRCP should be considered to exclude gallstone pancreatitis.       This report was finalized on 5/15/2023 9:05 PM by Dr. Clau Montoya M.D.              Assessment & Plan   Active Hospital Problems    Diagnosis    • **Acute gallstone pancreatitis    • Transaminitis    • Elevated white blood cell count        Assessment:  1. Acute pancreatitis, likely related to gallstone  2. Cholelithiasis  3. Transaminitis  4. Leukocytosis    Plan:  · Continue clear liquid diet  · Supportive care with pain meds and antiemetics.  If unable to tolerate clear liquids, may need IV fluids.  · Continue to monitor LFTs and CBC.  Overall improving labs.  · Suspect biliary stone which passed prior to CT scan, reflected an improvement in labs and symptoms.  · General surgery plans to proceed with laparoscopic cholecystectomy once pancreatitis is improved.  Plan for IOC at that time.  GI will await results of IOC to determine if ERCP is necessary.      I discussed the patients findings and my recommendations with patient.    Dragon dictation used throughout this note.            Marion Medrano PA-C  [facility]  [address] [place] [address]  Office: [phone]        Electronically signed by Marion Medrano PA-C at 05/16/23 3384     Darshan Carter MD at 05/16/23 1006      Consult Orders    1. Inpatient General Surgery Consult [[id number]] ordered by Meron Varner APRN at 05/16/23 0913    2. Inpatient General Surgery Consult [[id number]] ordered by Rehan Goldberg MD at 05/16/23 0909             Cc: Biliary pancreatitis    History of presenting illness:   This is a nice 22-year-old recently postpartum female who  presented to an outside emergency department last night with severe epigastric pain.  She has had some on and off episodes of right upper quadrant pain for the last couple of months since she delivered her child, but the episode yesterday was quite intense and did not alejandrina as her previous episodes had.  She describes pain which radiates into her chest but not to the back.  There was associated nausea.  Evaluation in the emergency department suggested biliary pancreatitis.  Symptoms may be mildly better this morning.    Past Medical History: Denies    Past Surgical History: No history of abdominal surgery, wisdom tooth extraction    Medications: Reviewed and reconciled in epic    Allergies: None known    Social History: She is a non-smoker, recently postpartum    Family History: Negative for colorectal cancer, denies known family history of gallbladder disease    Review of Systems:  Constitutional: Positive for decreased appetite, denies fever or chills  Neck: no swollen glands or dysphagia or odynophagia  Respiratory: negative for SOB, cough, hemoptysis or wheezing  Cardiovascular: negative for chest pain, palpitations or peripheral edema  Gastrointestinal: Positive for abdominal pain, bloating, nausea      Physical Exam:  BMI: 35.4  Temperature 98.4 heart rate 74 blood pressure 109/73  General: alert and oriented, appropriate, no acute distress  Eyes: No scleral icterus, extraocular movements are intact  Neck: Supple without lymphadenopathy or thyromegaly, trachea is in the midline  Respiratory: There is good bilateral chest expansion, no use of accessory muscles is noted  Cardiovascular: No jugular venous distention or peripheral edema is seen  Gastrointestinal: Soft, there is moderate epigastric tenderness without guarding.  No mass or hernia felt.    Laboratory data: White blood cell count 11 from 13.5 yesterday.  Hemoglobin 13.9.  Lipase of greater than 3000 yesterday improved to about 1300 today.  Lactate  normal.  Total bilirubin was mildly elevated yesterday at 1.8, normalized today at 1.0 and LFTs are in the 4-500 range although downtrending.    Imaging data: CT images reviewed by me.  There may be mild gallbladder wall thickening but it does appear distended.  There is probably some layering stones.  Common bile duct not obviously dilated.  There is mild inflammatory change around the pancreas.      Assessment and plan:   -Biliary pancreatitis  -Options discussed with patient.  Lipase appears to be downtrending nicely and symptoms are improving and I anticipate that by tomorrow her pancreatitis will be improved.  I am tentatively trying to get her on the schedule for robotic cholecystectomy and cholangiogram tomorrow afternoon.  She understands that if the pancreatitis remains fairly severe we may have to delay this by another day or 2.  She is agreeable to proceed as outlined.    Risks associated with the procedure are noted to include, but not be limited to, bleeding, infection, injury to intra-abdominal structures including the liver, small and large intestine, stomach, duodenum, common bile duct and major vascular structures.  Possible need for conversion to open surgery, further revisional surgery, postoperative ERCP discussed.      Darshan Carter MD, FACS  General, Minimally Invasive and Endoscopic Surgery  [facility]    [address], [address]  [place], [place], [address]  P: [phone]  F: [phone]       Electronically signed by Darshan Carter MD at 05/16/23 1011

## 2023-05-16 NOTE — ED NOTES
Call placed to  EMS at this time to notify of need for transport to [facility]. Message left. Awaiting return call.

## 2023-05-16 NOTE — CONSULTS
[facility]  Initial Inpatient Consult Note    Referring Provider: [facility]     Reason for Consultation: Gallstone pancreatitis    Subjective     History of present illness:      Thank you for allowing us to participate in the care of this patient.  22 y.o. female unknown our service presented to the hospital with severe epigastric pain radiating to her chest that started 0500 5/15 and progressively worsened.  She reports a history of intermittent right upper quadrant pain since March after she gave birth to her son.  Radiates to her chest.  Associated with nausea, occasional vomiting.  Worse with p.o. intake.  She denies melena hematochezia, diarrhea, constipation, coffee-ground emesis, hematemesis, fever, shortness of air.    5/15 Labs showed elevated alkaline phosphatase at 221, , , bilirubin 1.8.  Lipase > 3000, WBC 13.55.  All of these labs are improving.  Contrasted CT scan with acute pancreatitis with suspected cholelithiasis, possible gallstone pancreatitis.      5/16 labs CBC with white blood cell count 11.02 today, INR normal, lipase 1299, , ,     Denies family history of colon cancer.  Maternal grandmother with breast cancer.  She does not use tobacco products, denies alcohol or drug use.  No significant medical history other than recent vaginal birth.      Past Medical History:  Past Medical History:   Diagnosis Date   • Iron deficiency anemia during pregnancy 01/26/2023     Past Surgical History:  Past Surgical History:   Procedure Laterality Date   • WISDOM TOOTH EXTRACTION        Social History:   Social History     Tobacco Use   • Smoking status: Never   • Smokeless tobacco: Never   Substance Use Topics   • Alcohol use: Not Currently      Family History:  Family History   Problem Relation Age of Onset   • No Known Problems Mother    • Hyperlipidemia Father    • Breast cancer Maternal Grandmother    • Ovarian cancer Neg Hx    • Colon cancer Neg Hx     • Osteoporosis Neg Hx    • Pancreatic cancer Neg Hx        Home Meds:  Medications Prior to Admission   Medication Sig Dispense Refill Last Dose   • ibuprofen (ADVIL,MOTRIN) 600 MG tablet Take 1 tablet by mouth Every 6 (Six) Hours As Needed for Mild Pain (First Line: Mild pain.). 60 tablet 1      Current Meds:   cefTRIAXone, 1 g, Intravenous, Q24H  famotidine, 20 mg, Intravenous, Q12H      Allergies:  No Known Allergies  Review of Systems  Negative except as noted in the HPI    Objective     Vital Signs  Temp:  [97.3 °F (36.3 °C)-98.7 °F (37.1 °C)] 98.4 °F (36.9 °C)  Heart Rate:  [50-93] 74  Resp:  [16-18] 18  BP: (101-132)/(63-87) 109/73  Physical Exam:   Constitutional:    Alert, cooperative, in no acute distress    Eyes:          conjunctivae and sclerae normal, no icterus    HENT:   Atraumatic, normal hearing, mucosa moist    Lungs:     normal respiratory effort    Abdomen:     Soft, nondistended, diffuse upper abdominal tenderness; normal bowel sounds , no organomegaly    Extremities:   no edema or erythema of bilateral lower extremities    Skin:   No bruising, rash, or pallor   Neurologic:  No tremors, no asterixis    Psychiatric:  normal mood and affect; A&O x3     Results Review:   I reviewed the patient's new clinical results.    Results from last 7 days   Lab Units 05/16/23  0620 05/15/23  1937   WBC 10*3/mm3 11.02* 13.55*   HEMOGLOBIN g/dL 13.9 14.6   HEMATOCRIT % 42.1 44.3   PLATELETS 10*3/mm3 356 391     Results from last 7 days   Lab Units 05/16/23  0620 05/15/23  1937   SODIUM mmol/L 140 137  137   POTASSIUM mmol/L 4.1 3.8  3.8   CHLORIDE mmol/L 108* 103  103   CO2 mmol/L 23.5 22.3  22.3   BUN mg/dL 9 11  11   CREATININE mg/dL 0.58 0.67  0.67   CALCIUM mg/dL 8.8 9.4  9.4   BILIRUBIN mg/dL 1.0 1.8*   ALK PHOS U/L 206* 221*   ALT (SGPT) U/L 546* 578*   AST (SGOT) U/L 462* 850*   GLUCOSE mg/dL 115* 115*  115*     Results from last 7 days   Lab Units 05/16/23  0620   INR  1.04     Lab Results    Lab Value Date/Time    LIPASE 1,299 (H) 05/16/2023 0620    LIPASE >3,000 (H) 05/15/2023 1937       Radiology:  CT Abdomen Pelvis With Contrast   Final Result       1.  Acute pancreatitis without a discrete or drainable fluid collection.   2.  Suspected cholelithiasis. Given the presence of concurrent   pancreatitis, further evaluation with right upper quadrant ultrasound or   ERCP/MRCP should be considered to exclude gallstone pancreatitis.       This report was finalized on 5/15/2023 9:05 PM by Dr. Clau Montoya M.D.              Assessment & Plan   Active Hospital Problems    Diagnosis    • **Acute gallstone pancreatitis    • Transaminitis    • Elevated white blood cell count        Assessment:  1. Acute pancreatitis, likely related to gallstone  2. Cholelithiasis  3. Transaminitis  4. Leukocytosis    Plan:  · Continue clear liquid diet  · Supportive care with pain meds and antiemetics.  If unable to tolerate clear liquids, may need IV fluids.  · Continue to monitor LFTs and CBC.  Overall improving labs.  · Suspect biliary stone which passed prior to CT scan, reflected an improvement in labs and symptoms.  · General surgery plans to proceed with laparoscopic cholecystectomy once pancreatitis is improved.  Plan for IOC at that time.  GI will await results of IOC to determine if ERCP is necessary.      I discussed the patients findings and my recommendations with patient.    Dragon dictation used throughout this note.            Marion Medrano PA-C  [facility]  [address] [place] [address]  Office: [phone]

## 2023-05-16 NOTE — H&P
Patient Name:  Viry Obrien  YOB: 2000  MRN:  [id number]  Admit Date:  5/15/2023  Patient Care Team:  Provider, No Known as PCP - Winsome Junior MD as Consulting Physician (Obstetrics and Gynecology)      Subjective   History Present Illness     Chief Complaint   Patient presents with   • Abdominal Pain     Patient reporting epigastric pain on and off for the past 9 weeks but consistent since 0500 this am. Patient 9 weeks post partum normal vaginal. Pain is sharp in sensation with radiation to bilateral chest.       Ms. Obrien is a 22 y.o. that is 9 weeks postpartum from a unremarkable vaginal delivery that presents with epigastric pain.  She has had intermittent upper abdominal/right upper quadrant pain since she had her baby.  The episode prompting her to come to the ER was significantly worse with pain described as severe radiating to her chest but not her back.  Her prior episodes would last approximately 1 hour but resolved without intervention.  Associated symptoms include nausea and one episode of emesis that was nonbloody. No diarrhea fever chills palpitations or lightheadedness.  She denies overt bleeding, NSAID use, alcohol abuse.       History of Present Illness  Review of Systems   Constitutional: Negative for appetite change, chills, fever and unexpected weight change.   HENT: Negative for trouble swallowing.    Respiratory: Negative for choking and shortness of breath.    Cardiovascular: Negative for chest pain.   Gastrointestinal: Positive for abdominal pain, nausea and vomiting. Negative for abdominal distention, blood in stool, constipation and diarrhea.   Genitourinary: Negative for dysuria.   Musculoskeletal: Negative for back pain.   Skin: Negative for color change.   Neurological: Negative for dizziness and headaches.   Hematological: Does not bruise/bleed easily.   Psychiatric/Behavioral: Negative.  Negative for confusion.        Personal History     Past  Medical History:   Diagnosis Date   • Iron deficiency anemia during pregnancy 01/26/2023     Past Surgical History:   Procedure Laterality Date   • WISDOM TOOTH EXTRACTION       Family History   Problem Relation Age of Onset   • No Known Problems Mother    • Hyperlipidemia Father    • Breast cancer Maternal Grandmother    • Ovarian cancer Neg Hx    • Colon cancer Neg Hx    • Osteoporosis Neg Hx    • Pancreatic cancer Neg Hx      Social History     Tobacco Use   • Smoking status: Never   • Smokeless tobacco: Never   Vaping Use   • Vaping Use: Never used   Substance Use Topics   • Alcohol use: Not Currently   • Drug use: Never     No current facility-administered medications on file prior to encounter.     Current Outpatient Medications on File Prior to Encounter   Medication Sig Dispense Refill   • ibuprofen (ADVIL,MOTRIN) 600 MG tablet Take 1 tablet by mouth Every 6 (Six) Hours As Needed for Mild Pain (First Line: Mild pain.). 60 tablet 1   • [DISCONTINUED] ferrous sulfate 325 (65 FE) MG tablet Take 1 tablet by mouth Daily With Breakfast. 30 tablet 11   • [DISCONTINUED] Prenatal Vit-Fe Fumarate-FA (PRENATAL VITAMINS PO) Take  by mouth.       No Known Allergies    Objective    Objective     Vital Signs  Temp:  [97.3 °F (36.3 °C)-98.7 °F (37.1 °C)] 98.4 °F (36.9 °C)  Heart Rate:  [50-93] 74  Resp:  [16-18] 18  BP: (101-132)/(63-87) 109/73  SpO2:  [94 %-100 %] 97 %  on   ;   Device (Oxygen Therapy): room air  Body mass index is 35.43 kg/m².    Physical Exam  Vitals and nursing note reviewed.   Constitutional:       Appearance: She is well-developed.   HENT:      Head: Normocephalic.   Eyes:      Pupils: Pupils are equal, round, and reactive to light.   Cardiovascular:      Rate and Rhythm: Normal rate and regular rhythm.      Heart sounds: Normal heart sounds.   Pulmonary:      Effort: Pulmonary effort is normal.      Breath sounds: Normal breath sounds.   Abdominal:      General: Bowel sounds are normal. There is no  distension or abdominal bruit.      Palpations: Abdomen is soft. Abdomen is not rigid. There is no shifting dullness, fluid wave or mass.      Tenderness: There is abdominal tenderness (Epigastric pain). There is no guarding. Negative signs include Medrano's sign.      Hernia: No hernia is present. There is no hernia in the ventral area.   Genitourinary:     Rectum: No mass, tenderness, anal fissure, external hemorrhoid or internal hemorrhoid.   Musculoskeletal:         General: Normal range of motion.   Skin:     General: Skin is dry.   Neurological:      Mental Status: She is alert.   Psychiatric:         Behavior: Behavior normal.         Thought Content: Thought content normal.         Judgment: Judgment normal.         Results Review:  I reviewed the patient's new clinical results.  I reviewed the patient's new imaging results and agree with the interpretation.  I reviewed the patient's other test results and agree with the interpretation  I personally viewed and interpreted the patient's EKG/Telemetry data  Discussed with ED provider.    Lab Results (last 24 hours)     Procedure Component Value Units Date/Time    Pregnancy, Urine - Urine, Clean Catch [[id number]]  (Normal) Collected: 05/15/23 1936    Specimen: Urine, Clean Catch Updated: 05/15/23 1944     HCG, Urine QL Negative    Urinalysis without microscopic (no culture) - Urine, Clean Catch [[id number]]  (Abnormal) Collected: 05/15/23 1936    Specimen: Urine, Clean Catch Updated: 05/15/23 1943     Color, UA Yellow     Appearance, UA Clear     pH, UA 6.5     Specific Gravity, UA 1.025     Glucose, UA Negative     Ketones, UA Negative     Bilirubin, UA Small (1+)     Blood, UA Small (1+)     Protein, UA >=300 mg/dL (3+)     Leuk Esterase, UA Negative     Nitrite, UA Negative     Urobilinogen, UA 1.0 E.U./dL    CBC & Differential [[id number]]  (Abnormal) Collected: 05/15/23 1937    Specimen: Blood Updated: 05/15/23 1940    Narrative:      The following orders  were created for panel order CBC & Differential.  Procedure                               Abnormality         Status                     ---------                               -----------         ------                     CBC Auto Differential[[id number]]        Abnormal            Final result                 Please view results for these tests on the individual orders.    Basic Metabolic Panel [[id number]]  (Abnormal) Collected: 05/15/23 1937    Specimen: Blood Updated: 05/15/23 1955     Glucose 115 mg/dL      BUN 11 mg/dL      Creatinine 0.67 mg/dL      Sodium 137 mmol/L      Potassium 3.8 mmol/L      Chloride 103 mmol/L      CO2 22.3 mmol/L      Calcium 9.4 mg/dL      BUN/Creatinine Ratio 16.4     Anion Gap 11.7 mmol/L      eGFR 125.3 mL/min/1.73     Narrative:      GFR Normal >60  Chronic Kidney Disease <60  Kidney Failure <15      CBC Auto Differential [[id number]]  (Abnormal) Collected: 05/15/23 1937    Specimen: Blood Updated: 05/15/23 1940     WBC 13.55 10*3/mm3      RBC 5.46 10*6/mm3      Hemoglobin 14.6 g/dL      Hematocrit 44.3 %      MCV 81.1 fL      MCH 26.7 pg      MCHC 33.0 g/dL      RDW 14.8 %      RDW-SD 44.2 fl      MPV 9.2 fL      Platelets 391 10*3/mm3      Neutrophil % 87.0 %      Lymphocyte % 7.9 %      Monocyte % 4.6 %      Eosinophil % 0.3 %      Basophil % 0.1 %      Immature Grans % 0.1 %      Neutrophils, Absolute 11.78 10*3/mm3      Lymphocytes, Absolute 1.07 10*3/mm3      Monocytes, Absolute 0.62 10*3/mm3      Eosinophils, Absolute 0.04 10*3/mm3      Basophils, Absolute 0.02 10*3/mm3      Immature Grans, Absolute 0.02 10*3/mm3     Comprehensive Metabolic Panel [[id number]]  (Abnormal) Collected: 05/15/23 1937    Specimen: Blood Updated: 05/15/23 2056     Glucose 115 mg/dL      BUN 11 mg/dL      Creatinine 0.67 mg/dL      Sodium 137 mmol/L      Potassium 3.8 mmol/L      Chloride 103 mmol/L      CO2 22.3 mmol/L      Calcium 9.4 mg/dL      Total Protein 7.2 g/dL      Albumin 4.4 g/dL       ALT (SGPT) 578 U/L      AST (SGOT) 850 U/L      Alkaline Phosphatase 221 U/L      Total Bilirubin 1.8 mg/dL      Globulin 2.8 gm/dL      A/G Ratio 1.6 g/dL      BUN/Creatinine Ratio 16.4     Anion Gap 11.7 mmol/L      eGFR 125.3 mL/min/1.73     Narrative:      GFR Normal >60  Chronic Kidney Disease <60  Kidney Failure <15      Lipase [[id number]]  (Abnormal) Collected: 05/15/23 1937    Specimen: Blood Updated: 05/15/23 2116     Lipase >3,000 U/L     Lactic Acid, Plasma [[id number]]  (Normal) Collected: 05/15/23 2139    Specimen: Blood Updated: 05/15/23 2156     Lactate 0.9 mmol/L     Comprehensive Metabolic Panel [[id number]]  (Abnormal) Collected: 05/16/23 0620    Specimen: Blood Updated: 05/16/23 0646     Glucose 115 mg/dL      BUN 9 mg/dL      Creatinine 0.58 mg/dL      Sodium 140 mmol/L      Potassium 4.1 mmol/L      Chloride 108 mmol/L      CO2 23.5 mmol/L      Calcium 8.8 mg/dL      Total Protein 6.5 g/dL      Albumin 3.9 g/dL      ALT (SGPT) 546 U/L      AST (SGOT) 462 U/L      Alkaline Phosphatase 206 U/L      Total Bilirubin 1.0 mg/dL      Globulin 2.6 gm/dL      A/G Ratio 1.5 g/dL      BUN/Creatinine Ratio 15.5     Anion Gap 8.5 mmol/L      eGFR 129.8 mL/min/1.73     Narrative:      GFR Normal >60  Chronic Kidney Disease <60  Kidney Failure <15      CBC (No Diff) [[id number]]  (Abnormal) Collected: 05/16/23 0620    Specimen: Blood Updated: 05/16/23 0624     WBC 11.02 10*3/mm3      RBC 5.14 10*6/mm3      Hemoglobin 13.9 g/dL      Hematocrit 42.1 %      MCV 81.9 fL      MCH 27.0 pg      MCHC 33.0 g/dL      RDW 15.1 %      RDW-SD 45.2 fl      MPV 9.1 fL      Platelets 356 10*3/mm3     Protime-INR [[id number]]  (Normal) Collected: 05/16/23 0620    Specimen: Blood Updated: 05/16/23 0905     Protime 13.7 Seconds      INR 1.04    Lipase [[id number]]  (Abnormal) Collected: 05/16/23 0620    Specimen: Blood Updated: 05/16/23 0646     Lipase 1,299 U/L           Imaging Results (Last 24 Hours)     Procedure Component Value  Units Date/Time    CT Abdomen Pelvis With Contrast [[id number]] Collected: 05/15/23 2059     Updated: 05/15/23 2108    Narrative:      CT ABDOMEN PELVIS W CONTRAST-     HISTORY: Intermittent epigastric, chest, and right upper quadrant pain  that started a few weeks after she gave birth in March.     TECHNIQUE:  CT of the abdomen and pelvis was performed following the  administration of intravenous contrast.  Radiation dose reduction  techniques were utilized, including automated exposure control and  exposure modulation based on body size.     COMPARISON:  None.     FINDINGS:     Heart size is normal. There is no pericardial effusion. Lung bases and  pleural spaces are clear.  The liver is normal in size. No suspicious focal intrahepatic lesion is  identified. There is suspected cholelithiasis. The spleen is normal in  size. There is moderate peripancreatic fat stranding/edema with small  volume peripancreatic fluid. There is no discrete or drainable  collection. The adrenal glands are within normal limits. The kidneys are  within normal limits.  No pathologically enlarged abdominal or pelvic lymph nodes are  identified. The abdominal aorta is normal in caliber.   There is no bowel obstruction. The appendix is visualized. The uterus is  present and slightly heterogeneous, consistent with recent postgravid  state. There is no adnexal mass. The bladder is poorly distended.  The visualized osseous structures are age-appropriate.          Impression:         1.  Acute pancreatitis without a discrete or drainable fluid collection.  2.  Suspected cholelithiasis. Given the presence of concurrent  pancreatitis, further evaluation with right upper quadrant ultrasound or  ERCP/MRCP should be considered to exclude gallstone pancreatitis.     This report was finalized on 5/15/2023 9:05 PM by Dr. Clau Montoya M.D.                 No orders to display        Assessment/Plan     Active Hospital Problems    Diagnosis  POA   •  **Acute gallstone pancreatitis [K85.10]  Yes   • Transaminitis [R74.01]  Unknown   • Elevated white blood cell count [D72.829]  Unknown      Resolved Hospital Problems   No resolved problems to display.       Ms. Obrien is a 22 y.o. that presents with abdominal pain and work-up consistent with biliary pancreatitis.    · Acute gallstone pancreatitis  WBC 13.5 now 11, lipase >3k now 1299. CT with pancreatitis/gallbladder thickening with stones.  No CBD dilation.  Bilirubin elevated LFTs 400-500.   General surgery and gastroenterology consulted.  Trend liver enzymes.  Continue IV fluids   n.p.o., supportive care.  Continue IV Rocephin but not sure if warranted, ask GI.  Pepcid scheduled.  Morphine and Zofran as needed     S/p vaginal delivery March 2023   · I discussed the patient's findings and my recommendations with patient and nursing staff Dr Goldberg.    VTE Prophylaxis - SCDs.  Code Status - Full code.       GARCÍA Brooks  [facility]  05/16/23  11:05 EDT

## 2023-05-16 NOTE — ED NOTES
Pt notified of bed availability at [facility] and call for transport. Pt requests to go via private vehicle. Discussed with Dr Chase, who agreed. Pt notified and she states her boyfriend will come to transport her

## 2023-05-16 NOTE — FSED PROVIDER NOTE
Subjective   History of Present Illness  Patient reports intermittent epigastric and right upper quadrant pain that started a few weeks after she gave birth in March.  She states that usually goes away after at least an hour but has been there all day today and associated with 1 episode of vomiting.  She states it does not radiate to her chest or back and has no associated fevers, chills or UTI symptoms.  She had a vaginal delivery in March and denies any previous abdominal surgeries.        Review of Systems   Constitutional: Negative for chills, diaphoresis and fever.   HENT: Negative for congestion, sore throat and trouble swallowing.    Eyes: Negative for visual disturbance.   Respiratory: Negative for cough and shortness of breath.    Cardiovascular: Negative for chest pain and palpitations.   Gastrointestinal: Positive for abdominal pain, nausea and vomiting. Negative for constipation and diarrhea.   Genitourinary: Negative for dysuria, frequency, hematuria and urgency.   Skin: Negative for rash.   Neurological: Negative for weakness, light-headedness, numbness and headaches.   Psychiatric/Behavioral: Negative for confusion.       Past Medical History:   Diagnosis Date   • Iron deficiency anemia during pregnancy 01/26/2023       No Known Allergies    Past Surgical History:   Procedure Laterality Date   • WISDOM TOOTH EXTRACTION         Family History   Problem Relation Age of Onset   • No Known Problems Mother    • Hyperlipidemia Father    • Breast cancer Maternal Grandmother    • Ovarian cancer Neg Hx    • Colon cancer Neg Hx    • Osteoporosis Neg Hx    • Pancreatic cancer Neg Hx        Social History     Socioeconomic History   • Marital status: Single   Tobacco Use   • Smoking status: Never   • Smokeless tobacco: Never   Vaping Use   • Vaping Use: Never used   Substance and Sexual Activity   • Alcohol use: Not Currently   • Drug use: Never   • Sexual activity: Yes     Partners: Male     Birth  control/protection: None           Objective   Physical Exam  Constitutional:       General: She is not in acute distress.     Appearance: She is well-developed. She is not diaphoretic.   HENT:      Head: Normocephalic and atraumatic.      Nose: Nose normal.   Eyes:      Conjunctiva/sclera: Conjunctivae normal.      Pupils: Pupils are equal, round, and reactive to light.   Neck:      Thyroid: No thyromegaly.      Vascular: No JVD.      Trachea: No tracheal deviation.   Cardiovascular:      Rate and Rhythm: Normal rate and regular rhythm.      Heart sounds: Normal heart sounds. No murmur heard.    No friction rub. No gallop.   Pulmonary:      Effort: Pulmonary effort is normal. No respiratory distress.      Breath sounds: Normal breath sounds. No stridor. No wheezing or rales.   Abdominal:      General: Bowel sounds are normal. There is no distension.      Palpations: Abdomen is soft. There is no mass.      Tenderness: There is abdominal tenderness in the right upper quadrant and epigastric area. There is no guarding or rebound.      Hernia: No hernia is present.   Musculoskeletal:         General: No tenderness or deformity. Normal range of motion.      Cervical back: Normal range of motion and neck supple.   Skin:     General: Skin is warm and dry.      Capillary Refill: Capillary refill takes less than 2 seconds.      Coloration: Skin is not pale.      Findings: No erythema or rash.   Neurological:      Mental Status: She is alert and oriented to person, place, and time.      Cranial Nerves: No cranial nerve deficit.      Sensory: No sensory deficit.      Motor: No abnormal muscle tone.      Coordination: Coordination normal.   Psychiatric:         Behavior: Behavior normal.         Thought Content: Thought content normal.         Judgment: Judgment normal.         Procedures           ED Course  ED Course as of 05/15/23 2141   Mon May 15, 2023   2118 GI paged at this time [SM]      ED Course User Index  [SM]  Dima Jules, DO                                           Medical Decision Making  I spoke with Dr. Oliveira the GI specialist at [facility] who will be on consult for the patient.  He requested a dose of Rocephin before she is transferred. Dr. Leon the hospitalist has accepted the patient at this time.    Acute gallstone pancreatitis: acute illness or injury  Amount and/or Complexity of Data Reviewed  Labs: ordered.  Radiology: ordered.      Risk  Prescription drug management.  Decision regarding hospitalization.          Final diagnoses:   Acute gallstone pancreatitis       ED Disposition  ED Disposition     ED Disposition   Decision to Admit    Condition   --    Comment   Level of Care: Med/Surg [1]   Diagnosis: Acute gallstone pancreatitis [[id number]]   Admitting Physician: CONSTANZA LEON [[id number]]   Isolate for COVID?: No [0]   Certification: I Certify That Inpatient Hospital Services Are Medically Necessary For Greater Than 2 Midnights               No follow-up provider specified.       Medication List      No changes were made to your prescriptions during this visit.

## 2023-05-16 NOTE — CONSULTS
Cc: Biliary pancreatitis    History of presenting illness:   This is a nice 22-year-old recently postpartum female who presented to an outside emergency department last night with severe epigastric pain.  She has had some on and off episodes of right upper quadrant pain for the last couple of months since she delivered her child, but the episode yesterday was quite intense and did not alejandrina as her previous episodes had.  She describes pain which radiates into her chest but not to the back.  There was associated nausea.  Evaluation in the emergency department suggested biliary pancreatitis.  Symptoms may be mildly better this morning.    Past Medical History: Denies    Past Surgical History: No history of abdominal surgery, wisdom tooth extraction    Medications: Reviewed and reconciled in epic    Allergies: None known    Social History: She is a non-smoker, recently postpartum    Family History: Negative for colorectal cancer, denies known family history of gallbladder disease    Review of Systems:  Constitutional: Positive for decreased appetite, denies fever or chills  Neck: no swollen glands or dysphagia or odynophagia  Respiratory: negative for SOB, cough, hemoptysis or wheezing  Cardiovascular: negative for chest pain, palpitations or peripheral edema  Gastrointestinal: Positive for abdominal pain, bloating, nausea      Physical Exam:  BMI: 35.4  Temperature 98.4 heart rate 74 blood pressure 109/73  General: alert and oriented, appropriate, no acute distress  Eyes: No scleral icterus, extraocular movements are intact  Neck: Supple without lymphadenopathy or thyromegaly, trachea is in the midline  Respiratory: There is good bilateral chest expansion, no use of accessory muscles is noted  Cardiovascular: No jugular venous distention or peripheral edema is seen  Gastrointestinal: Soft, there is moderate epigastric tenderness without guarding.  No mass or hernia felt.    Laboratory data: White blood cell count 11  from 13.5 yesterday.  Hemoglobin 13.9.  Lipase of greater than 3000 yesterday improved to about 1300 today.  Lactate normal.  Total bilirubin was mildly elevated yesterday at 1.8, normalized today at 1.0 and LFTs are in the 4-500 range although downtrending.    Imaging data: CT images reviewed by me.  There may be mild gallbladder wall thickening but it does appear distended.  There is probably some layering stones.  Common bile duct not obviously dilated.  There is mild inflammatory change around the pancreas.      Assessment and plan:   -Biliary pancreatitis  -Options discussed with patient.  Lipase appears to be downtrending nicely and symptoms are improving and I anticipate that by tomorrow her pancreatitis will be improved.  I am tentatively trying to get her on the schedule for robotic cholecystectomy and cholangiogram tomorrow afternoon.  She understands that if the pancreatitis remains fairly severe we may have to delay this by another day or 2.  She is agreeable to proceed as outlined.    Risks associated with the procedure are noted to include, but not be limited to, bleeding, infection, injury to intra-abdominal structures including the liver, small and large intestine, stomach, duodenum, common bile duct and major vascular structures.  Possible need for conversion to open surgery, further revisional surgery, postoperative ERCP discussed.      Darshan Carter MD, FACS  General, Minimally Invasive and Endoscopic Surgery  [facility]    [address], [address]  [place], [place], [address]  P: [phone]  F: [phone]

## 2023-05-17 ENCOUNTER — ANESTHESIA EVENT (OUTPATIENT)
Dept: PERIOP | Facility: HOSPITAL | Age: 23
End: 2023-05-17
Payer: COMMERCIAL

## 2023-05-17 ENCOUNTER — APPOINTMENT (OUTPATIENT)
Dept: GENERAL RADIOLOGY | Facility: HOSPITAL | Age: 23
End: 2023-05-17
Payer: COMMERCIAL

## 2023-05-17 ENCOUNTER — ANESTHESIA (OUTPATIENT)
Dept: PERIOP | Facility: HOSPITAL | Age: 23
End: 2023-05-17
Payer: COMMERCIAL

## 2023-05-17 LAB
ALBUMIN SERPL-MCNC: 3.5 G/DL (ref 3.5–5.2)
ALBUMIN/GLOB SERPL: 1.5 G/DL
ALP SERPL-CCNC: 152 U/L (ref 39–117)
ALT SERPL W P-5'-P-CCNC: 280 U/L (ref 1–33)
AMYLASE SERPL-CCNC: 114 U/L (ref 28–100)
ANION GAP SERPL CALCULATED.3IONS-SCNC: 6 MMOL/L (ref 5–15)
AST SERPL-CCNC: 104 U/L (ref 1–32)
B-HCG UR QL: NEGATIVE
BILIRUB SERPL-MCNC: 0.5 MG/DL (ref 0–1.2)
BUN SERPL-MCNC: 5 MG/DL (ref 6–20)
BUN/CREAT SERPL: 9.4 (ref 7–25)
CALCIUM SPEC-SCNC: 8.4 MG/DL (ref 8.6–10.5)
CHLORIDE SERPL-SCNC: 106 MMOL/L (ref 98–107)
CHOLEST SERPL-MCNC: 152 MG/DL (ref 0–200)
CO2 SERPL-SCNC: 26 MMOL/L (ref 22–29)
CREAT SERPL-MCNC: 0.53 MG/DL (ref 0.57–1)
D-LACTATE SERPL-SCNC: 0.6 MMOL/L (ref 0.5–2)
DEPRECATED RDW RBC AUTO: 44 FL (ref 37–54)
EGFRCR SERPLBLD CKD-EPI 2021: 132.6 ML/MIN/1.73
ERYTHROCYTE [DISTWIDTH] IN BLOOD BY AUTOMATED COUNT: 15.2 % (ref 12.3–15.4)
EXPIRATION DATE: NORMAL
GLOBULIN UR ELPH-MCNC: 2.4 GM/DL
GLUCOSE SERPL-MCNC: 94 MG/DL (ref 65–99)
HCT VFR BLD AUTO: 37.4 % (ref 34–46.6)
HDLC SERPL-MCNC: 46 MG/DL (ref 40–60)
HGB BLD-MCNC: 12.1 G/DL (ref 12–15.9)
INTERNAL NEGATIVE CONTROL: NEGATIVE
INTERNAL POSITIVE CONTROL: POSITIVE
LDLC SERPL CALC-MCNC: 92 MG/DL (ref 0–100)
LDLC/HDLC SERPL: 1.99 {RATIO}
LIPASE SERPL-CCNC: 172 U/L (ref 13–60)
Lab: NORMAL
MAGNESIUM SERPL-MCNC: 1.9 MG/DL (ref 1.6–2.6)
MCH RBC QN AUTO: 26.2 PG (ref 26.6–33)
MCHC RBC AUTO-ENTMCNC: 32.4 G/DL (ref 31.5–35.7)
MCV RBC AUTO: 81 FL (ref 79–97)
PLATELET # BLD AUTO: 306 10*3/MM3 (ref 140–450)
PMV BLD AUTO: 9 FL (ref 6–12)
POTASSIUM SERPL-SCNC: 3.2 MMOL/L (ref 3.5–5.2)
PROT SERPL-MCNC: 5.9 G/DL (ref 6–8.5)
RBC # BLD AUTO: 4.62 10*6/MM3 (ref 3.77–5.28)
SODIUM SERPL-SCNC: 138 MMOL/L (ref 136–145)
TRIGL SERPL-MCNC: 73 MG/DL (ref 0–150)
VLDLC SERPL-MCNC: 14 MG/DL (ref 5–40)
WBC NRBC COR # BLD: 9.66 10*3/MM3 (ref 3.4–10.8)

## 2023-05-17 PROCEDURE — 83735 ASSAY OF MAGNESIUM: CPT | Performed by: HOSPITALIST

## 2023-05-17 PROCEDURE — 83690 ASSAY OF LIPASE: CPT | Performed by: SURGERY

## 2023-05-17 PROCEDURE — 25010000002 SUGAMMADEX 200 MG/2ML SOLUTION: Performed by: NURSE ANESTHETIST, CERTIFIED REGISTERED

## 2023-05-17 PROCEDURE — 8E0W4CZ ROBOTIC ASSISTED PROCEDURE OF TRUNK REGION, PERCUTANEOUS ENDOSCOPIC APPROACH: ICD-10-PCS | Performed by: SURGERY

## 2023-05-17 PROCEDURE — 47563 LAPARO CHOLECYSTECTOMY/GRAPH: CPT | Performed by: SURGERY

## 2023-05-17 PROCEDURE — 25010000002 ONDANSETRON PER 1 MG: Performed by: NURSE ANESTHETIST, CERTIFIED REGISTERED

## 2023-05-17 PROCEDURE — 25010000002 INDOCYANINE GREEN 25 MG RECONSTITUTED SOLUTION: Performed by: SURGERY

## 2023-05-17 PROCEDURE — 82150 ASSAY OF AMYLASE: CPT | Performed by: HOSPITALIST

## 2023-05-17 PROCEDURE — 0 IOTHALAMATE 60 % SOLUTION: Performed by: SURGERY

## 2023-05-17 PROCEDURE — 25010000002 CEFAZOLIN IN DEXTROSE 2-4 GM/100ML-% SOLUTION: Performed by: SURGERY

## 2023-05-17 PROCEDURE — 81025 URINE PREGNANCY TEST: CPT | Performed by: ANESTHESIOLOGY

## 2023-05-17 PROCEDURE — 25010000002 PROPOFOL 10 MG/ML EMULSION: Performed by: NURSE ANESTHETIST, CERTIFIED REGISTERED

## 2023-05-17 PROCEDURE — 83605 ASSAY OF LACTIC ACID: CPT | Performed by: HOSPITALIST

## 2023-05-17 PROCEDURE — 25010000002 DEXAMETHASONE PER 1 MG: Performed by: NURSE ANESTHETIST, CERTIFIED REGISTERED

## 2023-05-17 PROCEDURE — 80061 LIPID PANEL: CPT | Performed by: HOSPITALIST

## 2023-05-17 PROCEDURE — BF101ZZ FLUOROSCOPY OF BILE DUCTS USING LOW OSMOLAR CONTRAST: ICD-10-PCS | Performed by: SURGERY

## 2023-05-17 PROCEDURE — 74300 X-RAY BILE DUCTS/PANCREAS: CPT

## 2023-05-17 PROCEDURE — 80053 COMPREHEN METABOLIC PANEL: CPT | Performed by: SURGERY

## 2023-05-17 PROCEDURE — 0FT44ZZ RESECTION OF GALLBLADDER, PERCUTANEOUS ENDOSCOPIC APPROACH: ICD-10-PCS | Performed by: SURGERY

## 2023-05-17 PROCEDURE — C1758 CATHETER, URETERAL: HCPCS | Performed by: SURGERY

## 2023-05-17 PROCEDURE — 88304 TISSUE EXAM BY PATHOLOGIST: CPT | Performed by: SURGERY

## 2023-05-17 PROCEDURE — 85027 COMPLETE CBC AUTOMATED: CPT | Performed by: HOSPITALIST

## 2023-05-17 PROCEDURE — 99232 SBSQ HOSP IP/OBS MODERATE 35: CPT | Performed by: PHYSICIAN ASSISTANT

## 2023-05-17 PROCEDURE — 25010000002 FENTANYL CITRATE (PF) 50 MCG/ML SOLUTION: Performed by: NURSE ANESTHETIST, CERTIFIED REGISTERED

## 2023-05-17 DEVICE — MEDIUM-LARGE LIGATION CLIPS 6 CLIPS/CART
Type: IMPLANTABLE DEVICE | Site: ABDOMEN | Status: FUNCTIONAL
Brand: VAS-Q-CLIP

## 2023-05-17 RX ORDER — HYDRALAZINE HYDROCHLORIDE 20 MG/ML
5 INJECTION INTRAMUSCULAR; INTRAVENOUS
Status: DISCONTINUED | OUTPATIENT
Start: 2023-05-17 | End: 2023-05-17 | Stop reason: HOSPADM

## 2023-05-17 RX ORDER — PROMETHAZINE HYDROCHLORIDE 25 MG/1
25 TABLET ORAL ONCE AS NEEDED
Status: DISCONTINUED | OUTPATIENT
Start: 2023-05-17 | End: 2023-05-17 | Stop reason: HOSPADM

## 2023-05-17 RX ORDER — FLUMAZENIL 0.1 MG/ML
0.2 INJECTION INTRAVENOUS AS NEEDED
Status: DISCONTINUED | OUTPATIENT
Start: 2023-05-17 | End: 2023-05-17 | Stop reason: HOSPADM

## 2023-05-17 RX ORDER — POTASSIUM CHLORIDE 750 MG/1
40 TABLET, FILM COATED, EXTENDED RELEASE ORAL ONCE
Status: COMPLETED | OUTPATIENT
Start: 2023-05-17 | End: 2023-05-17

## 2023-05-17 RX ORDER — ONDANSETRON 2 MG/ML
4 INJECTION INTRAMUSCULAR; INTRAVENOUS ONCE AS NEEDED
Status: DISCONTINUED | OUTPATIENT
Start: 2023-05-17 | End: 2023-05-17 | Stop reason: HOSPADM

## 2023-05-17 RX ORDER — SODIUM CHLORIDE, SODIUM LACTATE, POTASSIUM CHLORIDE, CALCIUM CHLORIDE 600; 310; 30; 20 MG/100ML; MG/100ML; MG/100ML; MG/100ML
9 INJECTION, SOLUTION INTRAVENOUS CONTINUOUS
Status: DISCONTINUED | OUTPATIENT
Start: 2023-05-17 | End: 2023-05-17

## 2023-05-17 RX ORDER — PROPOFOL 10 MG/ML
VIAL (ML) INTRAVENOUS AS NEEDED
Status: DISCONTINUED | OUTPATIENT
Start: 2023-05-17 | End: 2023-05-17 | Stop reason: SURG

## 2023-05-17 RX ORDER — SODIUM CHLORIDE 9 MG/ML
40 INJECTION, SOLUTION INTRAVENOUS AS NEEDED
Status: DISCONTINUED | OUTPATIENT
Start: 2023-05-17 | End: 2023-05-17 | Stop reason: HOSPADM

## 2023-05-17 RX ORDER — EPHEDRINE SULFATE 50 MG/ML
5 INJECTION, SOLUTION INTRAVENOUS ONCE AS NEEDED
Status: DISCONTINUED | OUTPATIENT
Start: 2023-05-17 | End: 2023-05-17 | Stop reason: HOSPADM

## 2023-05-17 RX ORDER — DROPERIDOL 2.5 MG/ML
0.62 INJECTION, SOLUTION INTRAMUSCULAR; INTRAVENOUS
Status: DISCONTINUED | OUTPATIENT
Start: 2023-05-17 | End: 2023-05-17 | Stop reason: HOSPADM

## 2023-05-17 RX ORDER — LIDOCAINE HYDROCHLORIDE 10 MG/ML
0.5 INJECTION, SOLUTION EPIDURAL; INFILTRATION; INTRACAUDAL; PERINEURAL ONCE AS NEEDED
Status: DISCONTINUED | OUTPATIENT
Start: 2023-05-17 | End: 2023-05-17 | Stop reason: HOSPADM

## 2023-05-17 RX ORDER — MIDAZOLAM HYDROCHLORIDE 1 MG/ML
1 INJECTION INTRAMUSCULAR; INTRAVENOUS
Status: DISCONTINUED | OUTPATIENT
Start: 2023-05-17 | End: 2023-05-17 | Stop reason: HOSPADM

## 2023-05-17 RX ORDER — LABETALOL HYDROCHLORIDE 5 MG/ML
5 INJECTION, SOLUTION INTRAVENOUS
Status: DISCONTINUED | OUTPATIENT
Start: 2023-05-17 | End: 2023-05-17 | Stop reason: HOSPADM

## 2023-05-17 RX ORDER — BUPIVACAINE HYDROCHLORIDE AND EPINEPHRINE 5; 5 MG/ML; UG/ML
INJECTION, SOLUTION EPIDURAL; INTRACAUDAL; PERINEURAL AS NEEDED
Status: DISCONTINUED | OUTPATIENT
Start: 2023-05-17 | End: 2023-05-17 | Stop reason: HOSPADM

## 2023-05-17 RX ORDER — IPRATROPIUM BROMIDE AND ALBUTEROL SULFATE 2.5; .5 MG/3ML; MG/3ML
3 SOLUTION RESPIRATORY (INHALATION) ONCE AS NEEDED
Status: DISCONTINUED | OUTPATIENT
Start: 2023-05-17 | End: 2023-05-17 | Stop reason: HOSPADM

## 2023-05-17 RX ORDER — INDOCYANINE GREEN AND WATER 25 MG
2.5 KIT INJECTION ONCE
Status: COMPLETED | OUTPATIENT
Start: 2023-05-17 | End: 2023-05-17

## 2023-05-17 RX ORDER — SODIUM CHLORIDE 9 MG/ML
INJECTION, SOLUTION INTRAVENOUS AS NEEDED
Status: DISCONTINUED | OUTPATIENT
Start: 2023-05-17 | End: 2023-05-17 | Stop reason: HOSPADM

## 2023-05-17 RX ORDER — PROMETHAZINE HYDROCHLORIDE 25 MG/1
25 SUPPOSITORY RECTAL ONCE AS NEEDED
Status: DISCONTINUED | OUTPATIENT
Start: 2023-05-17 | End: 2023-05-17 | Stop reason: HOSPADM

## 2023-05-17 RX ORDER — ONDANSETRON 2 MG/ML
INJECTION INTRAMUSCULAR; INTRAVENOUS AS NEEDED
Status: DISCONTINUED | OUTPATIENT
Start: 2023-05-17 | End: 2023-05-17 | Stop reason: SURG

## 2023-05-17 RX ORDER — CEFAZOLIN SODIUM 2 G/100ML
2 INJECTION, SOLUTION INTRAVENOUS ONCE
Status: COMPLETED | OUTPATIENT
Start: 2023-05-17 | End: 2023-05-17

## 2023-05-17 RX ORDER — FENTANYL CITRATE 50 UG/ML
50 INJECTION, SOLUTION INTRAMUSCULAR; INTRAVENOUS
Status: DISCONTINUED | OUTPATIENT
Start: 2023-05-17 | End: 2023-05-17 | Stop reason: HOSPADM

## 2023-05-17 RX ORDER — HYDROCODONE BITARTRATE AND ACETAMINOPHEN 5; 325 MG/1; MG/1
1 TABLET ORAL EVERY 6 HOURS PRN
Status: DISCONTINUED | OUTPATIENT
Start: 2023-05-17 | End: 2023-05-18 | Stop reason: HOSPADM

## 2023-05-17 RX ORDER — FENTANYL CITRATE 50 UG/ML
INJECTION, SOLUTION INTRAMUSCULAR; INTRAVENOUS AS NEEDED
Status: DISCONTINUED | OUTPATIENT
Start: 2023-05-17 | End: 2023-05-17 | Stop reason: SURG

## 2023-05-17 RX ORDER — ROCURONIUM BROMIDE 10 MG/ML
INJECTION, SOLUTION INTRAVENOUS AS NEEDED
Status: DISCONTINUED | OUTPATIENT
Start: 2023-05-17 | End: 2023-05-17 | Stop reason: SURG

## 2023-05-17 RX ORDER — HYDROCODONE BITARTRATE AND ACETAMINOPHEN 7.5; 325 MG/1; MG/1
1 TABLET ORAL ONCE AS NEEDED
Status: DISCONTINUED | OUTPATIENT
Start: 2023-05-17 | End: 2023-05-17 | Stop reason: HOSPADM

## 2023-05-17 RX ORDER — DEXAMETHASONE SODIUM PHOSPHATE 4 MG/ML
INJECTION, SOLUTION INTRA-ARTICULAR; INTRALESIONAL; INTRAMUSCULAR; INTRAVENOUS; SOFT TISSUE AS NEEDED
Status: DISCONTINUED | OUTPATIENT
Start: 2023-05-17 | End: 2023-05-17 | Stop reason: SURG

## 2023-05-17 RX ORDER — FENTANYL CITRATE 50 UG/ML
50 INJECTION, SOLUTION INTRAMUSCULAR; INTRAVENOUS ONCE AS NEEDED
Status: DISCONTINUED | OUTPATIENT
Start: 2023-05-17 | End: 2023-05-17 | Stop reason: HOSPADM

## 2023-05-17 RX ORDER — SODIUM CHLORIDE 0.9 % (FLUSH) 0.9 %
3-10 SYRINGE (ML) INJECTION AS NEEDED
Status: DISCONTINUED | OUTPATIENT
Start: 2023-05-17 | End: 2023-05-17 | Stop reason: HOSPADM

## 2023-05-17 RX ORDER — DIPHENHYDRAMINE HYDROCHLORIDE 50 MG/ML
12.5 INJECTION INTRAMUSCULAR; INTRAVENOUS
Status: DISCONTINUED | OUTPATIENT
Start: 2023-05-17 | End: 2023-05-17 | Stop reason: HOSPADM

## 2023-05-17 RX ORDER — NALOXONE HCL 0.4 MG/ML
0.2 VIAL (ML) INJECTION AS NEEDED
Status: DISCONTINUED | OUTPATIENT
Start: 2023-05-17 | End: 2023-05-17 | Stop reason: HOSPADM

## 2023-05-17 RX ORDER — SODIUM CHLORIDE 0.9 % (FLUSH) 0.9 %
3 SYRINGE (ML) INJECTION EVERY 12 HOURS SCHEDULED
Status: DISCONTINUED | OUTPATIENT
Start: 2023-05-17 | End: 2023-05-17 | Stop reason: HOSPADM

## 2023-05-17 RX ORDER — LIDOCAINE HYDROCHLORIDE 20 MG/ML
INJECTION, SOLUTION INFILTRATION; PERINEURAL AS NEEDED
Status: DISCONTINUED | OUTPATIENT
Start: 2023-05-17 | End: 2023-05-17 | Stop reason: SURG

## 2023-05-17 RX ORDER — SODIUM CHLORIDE 0.9 % (FLUSH) 0.9 %
10 SYRINGE (ML) INJECTION EVERY 12 HOURS SCHEDULED
Status: DISCONTINUED | OUTPATIENT
Start: 2023-05-17 | End: 2023-05-17 | Stop reason: HOSPADM

## 2023-05-17 RX ORDER — HYDROMORPHONE HYDROCHLORIDE 1 MG/ML
0.5 INJECTION, SOLUTION INTRAMUSCULAR; INTRAVENOUS; SUBCUTANEOUS
Status: DISCONTINUED | OUTPATIENT
Start: 2023-05-17 | End: 2023-05-17 | Stop reason: HOSPADM

## 2023-05-17 RX ORDER — OXYCODONE AND ACETAMINOPHEN 7.5; 325 MG/1; MG/1
1 TABLET ORAL EVERY 4 HOURS PRN
Status: DISCONTINUED | OUTPATIENT
Start: 2023-05-17 | End: 2023-05-17 | Stop reason: HOSPADM

## 2023-05-17 RX ORDER — SODIUM CHLORIDE 0.9 % (FLUSH) 0.9 %
10 SYRINGE (ML) INJECTION AS NEEDED
Status: DISCONTINUED | OUTPATIENT
Start: 2023-05-17 | End: 2023-05-17 | Stop reason: HOSPADM

## 2023-05-17 RX ADMIN — DEXAMETHASONE SODIUM PHOSPHATE 8 MG: 4 INJECTION, SOLUTION INTRA-ARTICULAR; INTRALESIONAL; INTRAMUSCULAR; INTRAVENOUS; SOFT TISSUE at 13:01

## 2023-05-17 RX ADMIN — SODIUM CHLORIDE, POTASSIUM CHLORIDE, SODIUM LACTATE AND CALCIUM CHLORIDE 9 ML/HR: 600; 310; 30; 20 INJECTION, SOLUTION INTRAVENOUS at 11:35

## 2023-05-17 RX ADMIN — FENTANYL CITRATE 50 MCG: 50 INJECTION, SOLUTION INTRAMUSCULAR; INTRAVENOUS at 13:48

## 2023-05-17 RX ADMIN — INDOCYANINE GREEN AND WATER 2.5 MG: KIT at 11:55

## 2023-05-17 RX ADMIN — ROCURONIUM BROMIDE 40 MG: 50 INJECTION INTRAVENOUS at 12:51

## 2023-05-17 RX ADMIN — PROPOFOL 200 MG: 10 INJECTION, EMULSION INTRAVENOUS at 12:51

## 2023-05-17 RX ADMIN — SUGAMMADEX 200 MG: 100 INJECTION, SOLUTION INTRAVENOUS at 13:54

## 2023-05-17 RX ADMIN — FAMOTIDINE 20 MG: 10 INJECTION INTRAVENOUS at 11:55

## 2023-05-17 RX ADMIN — POTASSIUM CHLORIDE 40 MEQ: 750 TABLET, EXTENDED RELEASE ORAL at 17:35

## 2023-05-17 RX ADMIN — FENTANYL CITRATE 50 MCG: 50 INJECTION, SOLUTION INTRAMUSCULAR; INTRAVENOUS at 13:10

## 2023-05-17 RX ADMIN — ROCURONIUM BROMIDE 10 MG: 50 INJECTION INTRAVENOUS at 13:27

## 2023-05-17 RX ADMIN — CEFAZOLIN SODIUM 2 G: 2 INJECTION, SOLUTION INTRAVENOUS at 12:34

## 2023-05-17 RX ADMIN — LIDOCAINE HYDROCHLORIDE 60 MG: 20 INJECTION, SOLUTION INFILTRATION; PERINEURAL at 12:51

## 2023-05-17 RX ADMIN — SODIUM CHLORIDE, POTASSIUM CHLORIDE, SODIUM LACTATE AND CALCIUM CHLORIDE 9 ML/HR: 600; 310; 30; 20 INJECTION, SOLUTION INTRAVENOUS at 11:40

## 2023-05-17 RX ADMIN — ONDANSETRON 4 MG: 2 INJECTION INTRAMUSCULAR; INTRAVENOUS at 13:48

## 2023-05-17 RX ADMIN — FAMOTIDINE 20 MG: 10 INJECTION INTRAVENOUS at 00:20

## 2023-05-17 RX ADMIN — HYDROCODONE BITARTRATE AND ACETAMINOPHEN 1 TABLET: 5; 325 TABLET ORAL at 20:09

## 2023-05-17 RX ADMIN — SODIUM CHLORIDE 125 ML/HR: 9 INJECTION, SOLUTION INTRAVENOUS at 05:08

## 2023-05-17 NOTE — ANESTHESIA POSTPROCEDURE EVALUATION
"Patient: Viry Obrien    Procedure Summary     Date: 05/17/23 Room / Location: [facility] OR [address] [place] [address] MAIN OR    Anesthesia Start: 1249 Anesthesia Stop: 1411    Procedure: CHOLECYSTECTOMY LAPAROSCOPIC WITH DAVINCI ROBOT with cholangiogram (Abdomen) Diagnosis:       Acute gallstone pancreatitis      (Acute gallstone pancreatitis [K85.10])    Surgeons: Darshan Carter MD Provider: Delbert Loredo MD    Anesthesia Type: general ASA Status: 2          Anesthesia Type: general    Vitals  Vitals Value Taken Time   /78 05/17/23 1500   Temp 36.8 °C (98.3 °F) 05/17/23 1445   Pulse 85 05/17/23 1505   Resp 16 05/17/23 1445   SpO2 95 % 05/17/23 1505   Vitals shown include unvalidated device data.        Post Anesthesia Care and Evaluation    Patient location during evaluation: bedside  Patient participation: complete - patient participated  Level of consciousness: awake and alert  Pain management: adequate    Airway patency: patent  Anesthetic complications: No anesthetic complications  PONV Status: controlled  Cardiovascular status: acceptable and hemodynamically stable  Respiratory status: acceptable, spontaneous ventilation and nonlabored ventilation  Hydration status: acceptable    Comments: /78   Pulse 92   Temp 36.8 °C (98.3 °F) (Oral)   Resp 16   Ht 160 cm (63\")   Wt 90.7 kg (200 lb)   LMP 05/17/2023 (Exact Date) Comment: Delivered normal vaginal 3/12/2023  SpO2 97%   Breastfeeding No   BMI 35.43 kg/m²         "

## 2023-05-17 NOTE — ANESTHESIA PROCEDURE NOTES
Airway  Urgency: elective    Date/Time: 5/17/2023 12:55 PM  Airway not difficult    General Information and Staff    Patient location during procedure: OR    Indications and Patient Condition  Indications for airway management: airway protection    Preoxygenated: yes  MILS maintained throughout  Mask difficulty assessment: 1 - vent by mask    Final Airway Details  Final airway type: endotracheal airway      Successful airway: ETT  Cuffed: yes   Successful intubation technique: direct laryngoscopy  Facilitating devices/methods: intubating stylet  Endotracheal tube insertion site: oral  Blade: Yi  Blade size: 3  ETT size (mm): 7.0  Cormack-Lehane Classification: grade I - full view of glottis  Placement verified by: chest auscultation and capnometry   Measured from: lips  ETT/EBT  to lips (cm): 21  Number of attempts at approach: 1  Assessment: lips, teeth, and gum same as pre-op and atraumatic intubation

## 2023-05-17 NOTE — PLAN OF CARE
Goal Outcome Evaluation:  Plan of Care Reviewed With: patient        Progress: improving  Outcome Evaluation: Patient here with acute gallstone pancreatitis. VSS. IV pain medication helping with pain. Ambulating fine. NPO  midnight . Possible surgery today. Education provided on pain control and safety. Patient verbalized understanding.

## 2023-05-17 NOTE — PROGRESS NOTES
Name: Viry Obrien ADMIT: 5/15/2023   : 2000  PCP: Provider, No Known    MRN: [id number] LOS: 1 days   AGE/SEX: 22 y.o. female  ROOM: [facility] OR     Subjective   Subjective   Status post lap melony with cholangiogram. Doing well post op. Has not eaten yet . No flatus    Review of Systems     Objective   Objective   Vital Signs  Temp:  [98.4 °F (36.9 °C)-99.6 °F (37.6 °C)] 98.5 °F (36.9 °C)  Heart Rate:  [76-82] 79  Resp:  [16-17] 16  BP: (107-123)/(72-83) 117/74  SpO2:  [95 %-98 %] 98 %  on   ;   Device (Oxygen Therapy): room air  Body mass index is 35.43 kg/m².  Physical Exam  Vitals reviewed.   Constitutional:       Appearance: She is well-developed.   HENT:      Head: Normocephalic and atraumatic.      Mouth/Throat:      Mouth: Mucous membranes are moist.   Cardiovascular:      Rate and Rhythm: Normal rate and regular rhythm.   Pulmonary:      Effort: Pulmonary effort is normal. No respiratory distress.      Breath sounds: Normal breath sounds.   Abdominal:      General: Bowel sounds are normal. There is no distension.      Palpations: Abdomen is soft.      Tenderness: There is no abdominal tenderness.      Comments: Expected post op tenderness. Lap melony sites intact   Skin:     General: Skin is warm and dry.   Neurological:      General: No focal deficit present.      Mental Status: She is alert and oriented to person, place, and time.   Psychiatric:         Mood and Affect: Mood normal.         Behavior: Behavior normal.       Results Review     I reviewed the patient's new clinical results.  Results from last 7 days   Lab Units 23  0440 23  0620 05/15/23  1937   WBC 10*3/mm3 9.66 11.02* 13.55*   HEMOGLOBIN g/dL 12.1 13.9 14.6   PLATELETS 10*3/mm3 306 356 391     Results from last 7 days   Lab Units 23  0440 23  0620 05/15/23  1937   SODIUM mmol/L 138 140 137  137   POTASSIUM mmol/L 3.2* 4.1 3.8  3.8   CHLORIDE mmol/L 106 108* 103  103   CO2 mmol/L 26.0 23.5  22.3  22.3   BUN mg/dL 5* 9 11  11   CREATININE mg/dL 0.53* 0.58 0.67  0.67   GLUCOSE mg/dL 94 115* 115*  115*   EGFR mL/min/1.73 132.6 129.8 125.3  125.3     Results from last 7 days   Lab Units 05/17/23 0440 05/16/23  0620 05/15/23  1937   ALBUMIN g/dL 3.5 3.9 4.4   BILIRUBIN mg/dL 0.5 1.0 1.8*   ALK PHOS U/L 152* 206* 221*   AST (SGOT) U/L 104* 462* 850*   ALT (SGPT) U/L 280* 546* 578*     Results from last 7 days   Lab Units 05/17/23  0440 05/16/23  0620 05/15/23  1937   CALCIUM mg/dL 8.4* 8.8 9.4  9.4   ALBUMIN g/dL 3.5 3.9 4.4   MAGNESIUM mg/dL 1.9  --   --      Results from last 7 days   Lab Units 05/17/23  0440 05/15/23  2139   LACTATE mmol/L 0.6 0.9     No results found for: HGBA1C, POCGLU    CT Abdomen Pelvis With Contrast    Result Date: 5/15/2023   1.  Acute pancreatitis without a discrete or drainable fluid collection. 2.  Suspected cholelithiasis. Given the presence of concurrent pancreatitis, further evaluation with right upper quadrant ultrasound or ERCP/MRCP should be considered to exclude gallstone pancreatitis.  This report was finalized on 5/15/2023 9:05 PM by Dr. Clau Montoya M.D.      I have personally reviewed all medications:  Scheduled Medications  famotidine, 20 mg, Intravenous, Q12H  sodium chloride, 10 mL, Intravenous, Q12H  sodium chloride, 3 mL, Intravenous, Q12H    Infusions  lactated ringers, 9 mL/hr, Last Rate: 9 mL/hr (05/17/23 1249)  lactated ringers, 9 mL/hr, Last Rate: 9 mL/hr (05/17/23 1249)  sodium chloride, 125 mL/hr, Last Rate: 125 mL/hr (05/17/23 0508)    Diet  Diet: Liquid Diets; Clear Liquid; Texture: Regular Texture (IDDSI 7); Fluid Consistency: Thin (IDDSI 0)    I have personally reviewed:  [x]  Laboratory   [x]  Microbiology   [x]  Radiology   [x]  EKG/Telemetry  [x]  Cardiology/Vascular   [x]  Pathology    [x]  Records       Assessment/Plan     Active Hospital Problems    Diagnosis  POA   • **Acute gallstone pancreatitis [K85.10]  Yes   • Transaminitis [R74.01]   Yes   • Elevated white blood cell count [D72.829]  Yes      Resolved Hospital Problems   No resolved problems to display.       22 y.o. female admitted with Acute gallstone pancreatitis.    · Gallstone pancreatitis  Lap melony findings:  Sludge and/or tiny stones within the cystic duct. Mildly inflamed gallbladder.Moderate amount of pericholecystic fluid.No evidence of retained stone within the common bile duct with free spillage of contrast into the duodenum  Continue supportive care. Trend LFTs. Slow IVF as she resumes PO intake.     Hypokalemia- replace    · SCDs for DVT prophylaxis.  · Full code.  · Discussed with patient, family and nursing staff.  · Anticipate discharge by 5/19 or when ok with specialists      GARCÍA Brooks  [facility]  05/17/23  13:30 EDT

## 2023-05-17 NOTE — OP NOTE
Operative Note :   Darshan Carter MD    Viry Churchados  2000    Procedure Date: 05/17/23    Pre-op Diagnosis:  Acute gallstone pancreatitis [K85.10]    Post-Op Diagnosis:  Same    Procedure:   · Laparoscopic cholecystectomy with cholangiogram and da Anderson robot assisted    Surgeon: Darshan Carter MD    Assistant: Haydee Schuler RNFA was responsible for performing the following activities: Irrigation, suction, retraction, manipulation of the camera, closure of skin and placement of dressings as well as exchange of instruments at bedside and their skilled assistance was necessary for the success of this case.    Anesthesia:  General    EBL:   minimal    Specimens:   Gallbladder and contents    Indications:  · 22-year-old female with biliary pancreatitis which seems to be resolving presents for cholecystectomy    Findings:   · Sludge and/or tiny stones within the cystic duct  · Mildly inflamed gallbladder  · Moderate amount of pericholecystic fluid  · No evidence of retained stone within the common bile duct with free spillage of contrast into the duodenum    Recommendations:   · Low-fat diet    Description of procedure:    After obtaining informed consent, patient was brought to the operating room and placed under a general anesthetic.  The abdomen was sterilely prepped and draped.  Smith's point was identified and anesthetized with a Marcaine solution.  8 mm skin incision made and then 0 degree scope and Optiview trocar was used with direct access technique, placed through the abdominal wall layers into the abdominal cavity.  The abdomen was then insufflated and inspection of the abdomen demonstrated no evidence of intra-abdominal injury.  Additional 8 mm robotic trocars were then introduced into the abdomen with an 8 mm right lateral trocar, 8 mm right mid abdomen trocar and 8 mm left midabdomen trocar.  The patient was positioned with the head up and right side up.  The da Anderson Xi robot was then brought  up and docked.  From right to left the fenestrated bipolar grasper, 30 degree scope, monopolar hook and ProGrasp forceps were introduced under direct visualization.  I then moved to the console.  The fundus of the gallbladder was grasped and retracted cephalad.  Any adhesions to the fundus or infundibulum were taken down with a combination of sharp and blunt dissection, exposing the infundibulum.  The infundibulum was then grasped and retracted laterally.  The peritoneum overlying the cystic triangle was incised.  Next using a combination of sharp and blunt dissection and using fluorescent cholangiography, the cystic duct was dissected and identified completely.  The cystic artery was identified and dissected completely.  The remainder of the cystic triangle was cleared out, creating the retrocystic window and then the lower one third of the gallbladder was mobilized off the bed of the liver, giving the critical view of safety.  Again the fluoroscopic cholangiography was used to confirm impression of the anatomy.  2 clips were placed proximally on the cystic artery.  A clip was placed on the gallbladder side of the gallbladder cystic duct junction.  An incision was made in the cystic duct (a small amount of sludge/tiny stones was milked out of the cystic duct) and the cholangiocatheter was introduced through the abdominal wall and clipped into position in the cystic duct.  We then shot our cholangiogram.  There was good free flow of contrast into the duodenum and no filling defects were seen.  With some pressure we were able to fill the proximal biliary system.  The cholangiocatheter was then withdrawn and 2 clips were placed on the common duct side of the cystic duct.  The cystic duct was then divided between the clips.  The cystic artery was divided between clips.  The gallbladder was removed from the bed of the liver with electrocautery and then placed in an Endo Catch bag.  The bed of the liver was inspected and  any small bleeders were cauterized.  The clips on the cystic duct and cystic artery were inspected and appeared to be in good position.  The abdomen was irrigated.  Trocars were withdrawn under direct visualization after removal of the gallbladder.  Skin incisions were closed with 4-0 Monocryl.  Patient tolerated this well.    Darshan Carter MD  General and Endoscopic Surgery  [facility]    [address], [address]  [place], [place], [address]  P: [phone]

## 2023-05-17 NOTE — PROGRESS NOTES
[facility]  Inpatient Progress Note    Reason for Follow-up: Gallstone pancreatitis    Subjective     Interval History:   Patient reports continued improvement in her symptoms today.  Still with abdominal pain but overall better.  Nausea also bit better.  Denies vomiting.  Likely going for cholecystectomy today.    5/17 labs show continue improvement in her LFTs with , , .  Lipase improved to 172, amylase mildly elevated at 114, lipid panel unremarkable including triglycerides at 73.  CBC unremarkable.  WBC normalized to 9.66.    Current Facility-Administered Medications:   •  famotidine (PEPCID) injection 20 mg, 20 mg, Intravenous, Q12H, Abdoulaye Betts APRN, 20 mg at 05/17/23 0020  •  morphine injection 2 mg, 2 mg, Intravenous, Q2H PRN, Abdoulaye Betts APRN, 2 mg at 05/16/23 2156  •  ondansetron (ZOFRAN) tablet 4 mg, 4 mg, Oral, Q4H PRN **OR** ondansetron (ZOFRAN) injection 4 mg, 4 mg, Intravenous, Q6H PRN, Abdoulaye Betts APRN, 4 mg at 05/16/23 2156  •  sodium chloride 0.9 % flush 10 mL, 10 mL, Intravenous, PRN, Abdoulaye Betts APRN  •  sodium chloride 0.9 % infusion, 125 mL/hr, Intravenous, Continuous, Rehan Goldberg MD, Last Rate: 125 mL/hr at 05/17/23 0508, 125 mL/hr at 05/17/23 0508  Review of Systems:    The following systems were reviewed and negative;  respiratory and cardiovascular    Objective     Vital Signs  Temp:  [98.4 °F (36.9 °C)-99.6 °F (37.6 °C)] 98.7 °F (37.1 °C)  Heart Rate:  [76-82] 81  Resp:  [16-17] 16  BP: (107-123)/(72-83) 123/83  Body mass index is 35.43 kg/m².    Intake/Output Summary (Last 24 hours) at 5/17/2023 1113  Last data filed at 5/17/2023 0508  Gross per 24 hour   Intake 3315 ml   Output --   Net 3315 ml     No intake/output data recorded.     Physical Exam:    General: patient awake, alert and cooperative   Eyes: Normal lids and lashes, no scleral icterus   Skin: warm and dry, not jaundiced   Pulm: regular and  unlabored   Abdomen: soft, diffuse epigastric and right upper quadrant TTP with some guarding, nondistended; normal bowel sounds   Psychiatric: Normal mood and behavior; memory intact     Results Review:     I reviewed the patient's new clinical results.    Results from last 7 days   Lab Units 05/17/23  0440 05/16/23  0620 05/15/23  1937   WBC 10*3/mm3 9.66 11.02* 13.55*   HEMOGLOBIN g/dL 12.1 13.9 14.6   HEMATOCRIT % 37.4 42.1 44.3   PLATELETS 10*3/mm3 306 356 391     Results from last 7 days   Lab Units 05/17/23  0440 05/16/23  0620 05/15/23  1937   SODIUM mmol/L 138 140 137  137   POTASSIUM mmol/L 3.2* 4.1 3.8  3.8   CHLORIDE mmol/L 106 108* 103  103   CO2 mmol/L 26.0 23.5 22.3  22.3   BUN mg/dL 5* 9 11  11   CREATININE mg/dL 0.53* 0.58 0.67  0.67   CALCIUM mg/dL 8.4* 8.8 9.4  9.4   BILIRUBIN mg/dL 0.5 1.0 1.8*   ALK PHOS U/L 152* 206* 221*   ALT (SGPT) U/L 280* 546* 578*   AST (SGOT) U/L 104* 462* 850*   GLUCOSE mg/dL 94 115* 115*  115*     Results from last 7 days   Lab Units 05/16/23  0620   INR  1.04     Lab Results   Lab Value Date/Time    LIPASE 172 (H) 05/17/2023 0440    LIPASE 1,299 (H) 05/16/2023 0620    LIPASE >3,000 (H) 05/15/2023 1937       Radiology:  CT Abdomen Pelvis With Contrast   Final Result       1.  Acute pancreatitis without a discrete or drainable fluid collection.   2.  Suspected cholelithiasis. Given the presence of concurrent   pancreatitis, further evaluation with right upper quadrant ultrasound or   ERCP/MRCP should be considered to exclude gallstone pancreatitis.       This report was finalized on 5/15/2023 9:05 PM by Dr. Clau Montoya M.D.              Assessment & Plan     Active Hospital Problems    Diagnosis    • **Acute gallstone pancreatitis    • Transaminitis    • Elevated white blood cell count        Assessment:  1. Acute pancreatitis, likely related to gallstone  2. Cholelithiasis  3. Transaminitis  4. Leukocytosis      Plan:  • N.p.o. today in preparation for  surgery.  • Supportive care with pain meds and antiemetics.   • Continue to monitor LFTs and CBC.  Overall improving labs.  • Suspect biliary stone which passed prior to CT scan, reflected an improvement in labs and symptoms.  • General surgery plans to proceed with laparoscopic cholecystectomy once pancreatitis is improved.  Plan for IOC at that time.    • GI will await results of IOC to determine if ERCP is necessary.  If IOC without obstruction, GI will sign off.    I discussed the patients findings and my recommendations with patient.    Dragon dictation used throughout this note.            Marion Medrano PA-C  [facility]  [address] [place] [address]  Office: [phone]

## 2023-05-17 NOTE — ANESTHESIA PREPROCEDURE EVALUATION
Anesthesia Evaluation     Patient summary reviewed and Nursing notes reviewed   NPO Solid Status: > 8 hours  NPO Liquid Status: > 2 hours           Airway   Dental      Pulmonary - negative pulmonary ROS   Cardiovascular   Exercise tolerance: good (4-7 METS)    (-) hypertension      Neuro/Psych- negative ROS  (-) seizures, CVA  GI/Hepatic/Renal/Endo - negative ROS   (-) diabetes    Musculoskeletal (-) negative ROS    Abdominal    Substance History - negative use  (-) alcohol use, drug use     OB/GYN negative ob/gyn ROS         Other - negative ROS       ROS/Med Hx Other: Term delivery on 3/12/23                  Anesthesia Plan    ASA 2     general     intravenous induction     Anesthetic plan, risks, benefits, and alternatives have been provided, discussed and informed consent has been obtained with: patient.    Plan discussed with CRNA.        CODE STATUS:    Code Status (Patient has no pulse and is not breathing): CPR (Attempt to Resuscitate)  Medical Interventions (Patient has pulse or is breathing): Full Support

## 2023-05-18 VITALS
BODY MASS INDEX: 35.44 KG/M2 | SYSTOLIC BLOOD PRESSURE: 112 MMHG | TEMPERATURE: 98.3 F | HEIGHT: 63 IN | HEART RATE: 62 BPM | DIASTOLIC BLOOD PRESSURE: 75 MMHG | OXYGEN SATURATION: 97 % | WEIGHT: 200 LBS | RESPIRATION RATE: 16 BRPM

## 2023-05-18 PROBLEM — K85.10 ACUTE GALLSTONE PANCREATITIS: Status: RESOLVED | Noted: 2023-05-15 | Resolved: 2023-05-18

## 2023-05-18 PROBLEM — D72.829 ELEVATED WHITE BLOOD CELL COUNT: Status: RESOLVED | Noted: 2023-05-16 | Resolved: 2023-05-18

## 2023-05-18 PROBLEM — R74.01 TRANSAMINITIS: Status: RESOLVED | Noted: 2023-05-16 | Resolved: 2023-05-18

## 2023-05-18 LAB
ALBUMIN SERPL-MCNC: 3.6 G/DL (ref 3.5–5.2)
ALBUMIN/GLOB SERPL: 1.4 G/DL
ALP SERPL-CCNC: 136 U/L (ref 39–117)
ALT SERPL W P-5'-P-CCNC: 180 U/L (ref 1–33)
ANION GAP SERPL CALCULATED.3IONS-SCNC: 7 MMOL/L (ref 5–15)
AST SERPL-CCNC: 48 U/L (ref 1–32)
BILIRUB SERPL-MCNC: 0.4 MG/DL (ref 0–1.2)
BUN SERPL-MCNC: 5 MG/DL (ref 6–20)
BUN/CREAT SERPL: 11.6 (ref 7–25)
CALCIUM SPEC-SCNC: 8.7 MG/DL (ref 8.6–10.5)
CHLORIDE SERPL-SCNC: 110 MMOL/L (ref 98–107)
CO2 SERPL-SCNC: 24 MMOL/L (ref 22–29)
CREAT SERPL-MCNC: 0.43 MG/DL (ref 0.57–1)
DEPRECATED RDW RBC AUTO: 42 FL (ref 37–54)
EGFRCR SERPLBLD CKD-EPI 2021: 139.5 ML/MIN/1.73
ERYTHROCYTE [DISTWIDTH] IN BLOOD BY AUTOMATED COUNT: 14.6 % (ref 12.3–15.4)
GLOBULIN UR ELPH-MCNC: 2.6 GM/DL
GLUCOSE SERPL-MCNC: 106 MG/DL (ref 65–99)
HCT VFR BLD AUTO: 35.8 % (ref 34–46.6)
HGB BLD-MCNC: 12 G/DL (ref 12–15.9)
MCH RBC QN AUTO: 26.8 PG (ref 26.6–33)
MCHC RBC AUTO-ENTMCNC: 33.5 G/DL (ref 31.5–35.7)
MCV RBC AUTO: 79.9 FL (ref 79–97)
PLATELET # BLD AUTO: 328 10*3/MM3 (ref 140–450)
PMV BLD AUTO: 9.1 FL (ref 6–12)
POTASSIUM SERPL-SCNC: 4.1 MMOL/L (ref 3.5–5.2)
PROT SERPL-MCNC: 6.2 G/DL (ref 6–8.5)
RBC # BLD AUTO: 4.48 10*6/MM3 (ref 3.77–5.28)
SODIUM SERPL-SCNC: 141 MMOL/L (ref 136–145)
WBC NRBC COR # BLD: 12.43 10*3/MM3 (ref 3.4–10.8)

## 2023-05-18 PROCEDURE — 80053 COMPREHEN METABOLIC PANEL: CPT | Performed by: NURSE PRACTITIONER

## 2023-05-18 PROCEDURE — 85027 COMPLETE CBC AUTOMATED: CPT | Performed by: NURSE PRACTITIONER

## 2023-05-18 RX ORDER — DOCUSATE SODIUM 100 MG/1
100 CAPSULE, LIQUID FILLED ORAL 2 TIMES DAILY PRN
Qty: 20 CAPSULE | Refills: 0 | Status: SHIPPED | OUTPATIENT
Start: 2023-05-18 | End: 2023-05-28

## 2023-05-18 RX ORDER — ACETAMINOPHEN 325 MG/1
650 TABLET ORAL EVERY 6 HOURS PRN
Start: 2023-05-18

## 2023-05-18 RX ORDER — HYDROCODONE BITARTRATE AND ACETAMINOPHEN 5; 325 MG/1; MG/1
1 TABLET ORAL EVERY 4 HOURS PRN
Qty: 10 TABLET | Refills: 0 | Status: SHIPPED | OUTPATIENT
Start: 2023-05-18 | End: 2023-05-21

## 2023-05-18 RX ORDER — FAMOTIDINE 20 MG/1
20 TABLET, FILM COATED ORAL 2 TIMES DAILY
Qty: 28 TABLET | Refills: 0 | Status: SHIPPED | OUTPATIENT
Start: 2023-05-18 | End: 2023-06-01

## 2023-05-18 RX ORDER — ONDANSETRON 4 MG/1
4 TABLET, FILM COATED ORAL EVERY 8 HOURS PRN
Qty: 25 TABLET | Refills: 0 | Status: SHIPPED | OUTPATIENT
Start: 2023-05-18

## 2023-05-18 RX ADMIN — FAMOTIDINE 20 MG: 10 INJECTION INTRAVENOUS at 01:06

## 2023-05-18 RX ADMIN — FAMOTIDINE 20 MG: 10 INJECTION INTRAVENOUS at 11:11

## 2023-05-18 NOTE — DISCHARGE SUMMARY
Patient Name: Viry Obrien  : 2000  MRN: [id number]    Date of Admission: 5/15/2023  Date of Discharge:  2023  Primary Care Physician: Provider, No Known      Chief Complaint:   Abdominal Pain (Patient reporting epigastric pain on and off for the past 9 weeks but consistent since 0500 this am. Patient 9 weeks post partum normal vaginal. Pain is sharp in sensation with radiation to bilateral chest.)      Discharge Diagnoses     Active Hospital Problems   No active problems to display.      Resolved Hospital Problems    Diagnosis Date Resolved POA   • **Acute gallstone pancreatitis [K85.10] 2023 Yes   • Transaminitis [R74.01] 2023 Yes   • Elevated white blood cell count [D72.829] 2023 Yes        Hospital Course     Ms. Obrien is a 22 y.o. female approximately 9 weeks status post a vaginal delivery who presented to [facility] initially complaining of abdominal pain nausea vomiting.  Please see the admitting history and physical for further details.  She was found to have acute gallstone pancreatitis with associated transaminitis and leukocytosis.  She was admitted to the hospital with general surgery consult.  She underwent laparoscopic cholecystectomy yesterday per general surgery with sludge and tiny stones in the cystic duct, mildly inflamed gallbladder, moderate amount of pericholecystic fluid but no evidence of retained stone in the CBD.  Patient feeling better today tolerating a diet and passing flatus but no BM yet.  No nausea or vomiting fever or chills.  WBC is slightly elevated but likely a reactive to surgery.  General surgery has cleared her for discharge today.  She will follow-up in the outpatient setting with surgery and her PCP.      Day of Discharge     Subjective:  Feeling ok.  Tolerating diet.  Expected postop discomfort but manageable    Physical Exam:  Temp:  [97.9 °F (36.6 °C)-98.6 °F (37 °C)] 98.3 °F (36.8 °C)  Heart Rate:  [60-92] 62  Resp:   [16] 16  BP: (104-128)/(70-84) 112/75  Body mass index is 35.43 kg/m².  Physical Exam  Vitals reviewed.   Constitutional:       Appearance: She is well-developed. She is not ill-appearing.   HENT:      Head: Normocephalic and atraumatic.      Mouth/Throat:      Mouth: Mucous membranes are moist.   Cardiovascular:      Rate and Rhythm: Normal rate and regular rhythm.   Pulmonary:      Effort: Pulmonary effort is normal. No respiratory distress.      Breath sounds: Normal breath sounds.   Abdominal:      General: Bowel sounds are normal. There is no distension.      Palpations: Abdomen is soft.      Tenderness: There is no abdominal tenderness.      Comments: Appropriate postop tenderness.  Lap melony sites without complication   Skin:     General: Skin is warm and dry.   Neurological:      General: No focal deficit present.      Mental Status: She is alert and oriented to person, place, and time.   Psychiatric:         Mood and Affect: Mood normal.         Behavior: Behavior normal.         Thought Content: Thought content normal.         Consultants     Consult Orders (all) (From admission, onward)     Start     Ordered    05/16/23 0913  Inpatient General Surgery Consult  Once        Specialty:  General Surgery  Provider:  Darshan Carter MD    05/16/23 0913    05/16/23 0910  Inpatient General Surgery Consult  Once        Specialty:  General Surgery  Provider:  Darshan Carter MD    05/16/23 0909    05/16/23 0702  Inpatient Gastroenterology Consult  IN         Specialty:  Gastroenterology  Provider:  Dev Osborne MD    05/15/23 4663              Procedures     CHOLECYSTECTOMY LAPAROSCOPIC WITH DAVINCI ROBOT with cholangiogram      Imaging Results (All)     Procedure Component Value Units Date/Time    FL Cholangiogram Operative [[id number]] Collected: 05/17/23 1429     Updated: 05/17/23 1434    Narrative:      INTRAOPERATIVE PORTABLE VIEW CHOLANGIOGRAM     CLINICAL INFORMATION: Post cholecystectomy      FINDINGS: Upon injection of the cystic duct with contrast, the proximal  intrahepatic, common hepatic and common bile ducts are opacified.  Contrast passes into the duodenum. No filling defects are visualized to  suggest choledocholithiasis.     Fluoroscopy time 48 s, 314 images     This report was finalized on 5/17/2023 2:31 PM by Dr. Clau Montoya M.D.       CT Abdomen Pelvis With Contrast [[id number]] Collected: 05/15/23 2059     Updated: 05/15/23 2108    Narrative:      CT ABDOMEN PELVIS W CONTRAST-     HISTORY: Intermittent epigastric, chest, and right upper quadrant pain  that started a few weeks after she gave birth in March.     TECHNIQUE:  CT of the abdomen and pelvis was performed following the  administration of intravenous contrast.  Radiation dose reduction  techniques were utilized, including automated exposure control and  exposure modulation based on body size.     COMPARISON:  None.     FINDINGS:     Heart size is normal. There is no pericardial effusion. Lung bases and  pleural spaces are clear.  The liver is normal in size. No suspicious focal intrahepatic lesion is  identified. There is suspected cholelithiasis. The spleen is normal in  size. There is moderate peripancreatic fat stranding/edema with small  volume peripancreatic fluid. There is no discrete or drainable  collection. The adrenal glands are within normal limits. The kidneys are  within normal limits.  No pathologically enlarged abdominal or pelvic lymph nodes are  identified. The abdominal aorta is normal in caliber.   There is no bowel obstruction. The appendix is visualized. The uterus is  present and slightly heterogeneous, consistent with recent postgravid  state. There is no adnexal mass. The bladder is poorly distended.  The visualized osseous structures are age-appropriate.          Impression:         1.  Acute pancreatitis without a discrete or drainable fluid collection.  2.  Suspected cholelithiasis. Given the presence of  concurrent  pancreatitis, further evaluation with right upper quadrant ultrasound or  ERCP/MRCP should be considered to exclude gallstone pancreatitis.     This report was finalized on 5/15/2023 9:05 PM by Dr. Clau Montoya M.D.               Pertinent Labs     Results from last 7 days   Lab Units 05/18/23  0450 05/17/23  0440 05/16/23  0620 05/15/23  1937   WBC 10*3/mm3 12.43* 9.66 11.02* 13.55*   HEMOGLOBIN g/dL 12.0 12.1 13.9 14.6   PLATELETS 10*3/mm3 328 306 356 391     Results from last 7 days   Lab Units 05/18/23  0449 05/17/23  0440 05/16/23  0620 05/15/23  1937   SODIUM mmol/L 141 138 140 137  137   POTASSIUM mmol/L 4.1 3.2* 4.1 3.8  3.8   CHLORIDE mmol/L 110* 106 108* 103  103   CO2 mmol/L 24.0 26.0 23.5 22.3  22.3   BUN mg/dL 5* 5* 9 11  11   CREATININE mg/dL 0.43* 0.53* 0.58 0.67  0.67   GLUCOSE mg/dL 106* 94 115* 115*  115*   EGFR mL/min/1.73 139.5 132.6 129.8 125.3  125.3     Results from last 7 days   Lab Units 05/18/23  0449 05/17/23  0440 05/16/23  0620 05/15/23  1937   ALBUMIN g/dL 3.6 3.5 3.9 4.4   BILIRUBIN mg/dL 0.4 0.5 1.0 1.8*   ALK PHOS U/L 136* 152* 206* 221*   AST (SGOT) U/L 48* 104* 462* 850*   ALT (SGPT) U/L 180* 280* 546* 578*     Results from last 7 days   Lab Units 05/18/23  0449 05/17/23  0440 05/16/23  0620 05/15/23  1937   CALCIUM mg/dL 8.7 8.4* 8.8 9.4  9.4   ALBUMIN g/dL 3.6 3.5 3.9 4.4   MAGNESIUM mg/dL  --  1.9  --   --      Results from last 7 days   Lab Units 05/17/23  0440 05/16/23  0620 05/15/23  1937   AMYLASE U/L 114*  --   --    LIPASE U/L 172* 1,299* >3,000*         Results from last 7 days   Lab Units 05/17/23  0440   CHOLESTEROL mg/dL 152   TRIGLYCERIDES mg/dL 73   HDL CHOL mg/dL 46   LDL CHOL mg/dL 92             Test Results Pending at Discharge     Pending Labs     Order Current Status    Tissue Pathology Exam In process          Discharge Details        Discharge Medications      New Medications      Instructions Start Date   acetaminophen 325 MG  tablet  Commonly known as: Tylenol   650 mg, Oral, Every 6 Hours PRN      docusate sodium 100 MG capsule  Commonly known as: Colace   100 mg, Oral, 2 Times Daily PRN      famotidine 20 MG tablet  Commonly known as: Pepcid   20 mg, Oral, 2 Times Daily      HYDROcodone-acetaminophen 5-325 MG per tablet  Commonly known as: NORCO   1 tablet, Oral, Every 4 Hours PRN         Continue These Medications      Instructions Start Date   ibuprofen 600 MG tablet  Commonly known as: ADVIL,MOTRIN   600 mg, Oral, Every 6 Hours PRN             No Known Allergies    Discharge Disposition:  Home or Self Care      Discharge Diet:  Diet Order   Procedures   • Diet: Regular/House Diet, Gastrointestinal Diets; Fat-Restricted; Texture: Regular Texture (IDDSI 7); Fluid Consistency: Thin (IDDSI 0)       Discharge Activity:       CODE STATUS:    Code Status and Medical Interventions:   Ordered at: 05/15/23 2259     Code Status (Patient has no pulse and is not breathing):    CPR (Attempt to Resuscitate)     Medical Interventions (Patient has pulse or is breathing):    Full Support       Future Appointments   Date Time Provider Department Center   5/21/2024  9:10 AM Winsome Quintero MD [facility]      Follow-up Information     Provider, No Known .    Contact information:  [facility] [address]             Darshan Carter MD Follow up in 2 week(s).    Specialty: General Surgery  Contact information:  [facility] [facility] [address]  [facility] [address] [phone]                         Time Spent on Discharge:  Greater than 55 minutes      GARCÍA Brooks  [facility]  05/18/23  11:33 EDT

## 2023-05-18 NOTE — PLAN OF CARE
Goal Outcome Evaluation:  Plan of Care Reviewed With: patient           Outcome Evaluation: VSS. Patient remains A&Ox4. Up ad maryjane. PRN given for pain providng therapeutic relief. All questions answered with verbalized understanding. Care on going. Plans for d/c.

## 2023-05-18 NOTE — PLAN OF CARE
Goal Outcome Evaluation:      PT returned to floor at 1530 HRS, status post-op for CHOLECYSTECTOMY LAPAROSCOPIC WITH DAVINCI ROBOT with cholangiogram. A/O x 4. PT denies any discomfort. Insertion sites are dry and intact. Voided 270 cc at 1550 HRS. PT ambulated to  from stretcher. Needs met. WCTM.

## 2023-05-18 NOTE — DISCHARGE INSTRUCTIONS
Dr. Darshan Carter  [address]  [place] [id number] [phone]    Discharge Instructions for Gall Bladder Surgery      Go home, rest and take it easy today; however, you should get up and move about several times today to reduce the risk of developing a clot in your legs.      You may experience some dizziness or memory loss from the anesthesia.  This may last for the next 24 hours.  Someone should plan on staying with you for the first 24 hours for your safety.    Do not make any important legal decisions or sign any legal papers for the next 24 hours.      Eat and drink lightly today.  Start off with liquids, jello, soup, crackers or other bland foods at first. You may advance your diet tomorrow as tolerated as long as you do not experience any nausea or vomiting.     You may not have any dressing if surgical glue was used to close your incision. Keep your wound clean and dry at all times. Do not apply any cleaning products or Q-tips to the incisions.      If dressings were used, you may remove your outer dressings in 3 days.  The white tapes called steri-strips should stay in place.  They will fall off on their own in 1-2 weeks.  Do not worry if they come off sooner.      You may notice some bleeding/drainage on your outer dressings or incisions. A little bloody drainage is normal. If the bleeding/drainage is such that the bandage cannot absorb, remove the dressing if used, apply clean gauze and apply firm pressure for a full 15 minutes.  If the bleeding continues, please call me.    You may shower tomorrow, do not rub the incisions.  No tub baths until your incisions are completely healed.    You have received a prescription for a narcotic pain medicine, as you will have some pain following surgery.   You will not be totally pain free, but your pain medicine should make the pain tolerable.  Please take your pain medicine as prescribed and always take your pills with food to prevent nausea.  If you are having severe pain that cannot be controlled by the pain medicine, please contact me.      It is not unusual to experience pain/discomfort in your shoulders or under your ribs after surgery.  It is from the gas used during the laparoscopic procedure and usually lasts 1-3 days.  The prescription pain medicine is used to treat the surgical pain and does not typically alleviate this “gassy” pain.     No driving for 24 hours and for as long as you are taking your prescription pain medicine.      You will need to call the office at [phone] to schedule a follow-up appointment in 6-10 days.     Remember to contact me for any of the following:    Fever > 101 degrees  Severe pain that cannot be controlled by taking your pain pills  Severe nausea or vomiting that cannot be controlled by taking your nausea pills  Significant bleeding of your incisions  Drainage that has a bad smell or is yellow or green in appearance  Any other questions or concerns

## 2023-05-18 NOTE — CASE MANAGEMENT/SOCIAL WORK
Case Management Discharge Note      Final Note: Patient discharged home. No needs identified. Family to transport. Bria COLLAZO LCSW         Selected Continued Care - Discharged on 5/18/2023 Admission date: 5/15/2023 - Discharge disposition: Home or Self Care    Destination    No services have been selected for the patient.              Durable Medical Equipment    No services have been selected for the patient.              Dialysis/Infusion    No services have been selected for the patient.              Home Medical Care    No services have been selected for the patient.              Therapy    No services have been selected for the patient.              Community Resources    No services have been selected for the patient.              Community & DME    No services have been selected for the patient.                  Transportation Services  Private: Car    Final Discharge Disposition Code: 01 - home or self-care

## 2023-05-18 NOTE — PAYOR COMM NOTE
"Alan Colunga (22 y.o. Female)     PLEASE SEE ATTACHED FOR DC SUMMARY    REF#[id number]    THANK YOU    JOJO JARAMILLO LPN CCP    Date of Birth   2000    Social Security Number       Address   [address] [place] [address]    Home Phone   [phone]    MRN   [id number]       Alevism   None    Marital Status   Single                            Admission Date   5/15/23    Admission Type   Urgent    Admitting Provider   Rehan Goldberg MD    Attending Provider       Department, Room/Bed   [facility] [address], P882/1       Discharge Date   2023    Discharge Disposition   Home or Self Care    Discharge Destination                               Attending Provider: (none)   Allergies: No Known Allergies    Isolation: None   Infection: None   Code Status: CPR    Ht: 160 cm (63\")   Wt: 90.7 kg (200 lb)    Admission Cmt: None   Principal Problem: Acute gallstone pancreatitis [K85.10]                 Active Insurance as of 5/15/2023     Primary Coverage     Payor Plan Insurance Group Employer/Plan Group    [employer]UE [employer] BLUE [employer] PPO [id number]     Payor Plan Address Payor Plan Phone Number Payor Plan Fax Number Effective Dates    [address] [phone]  2022 - None Entered    [facility] [id number]       Subscriber Name Subscriber Birth Date Member ID       ALAN COLUNGA 2000 [id number]                 Emergency Contacts      (Rel.) Home Phone Work Phone Mobile Phone    BRUTON,XYREN (Significant Other) [phone] -- [phone]               Discharge Summary      Meron Varner, APRN at 23 1133              Patient Name: Alan Colunga  : 2000  MRN: [id number]    Date of Admission: 5/15/2023  Date of Discharge:  2023  Primary Care Physician: Provider, No Known      Chief Complaint:   Abdominal Pain (Patient reporting epigastric pain on and off for the past 9 weeks but consistent since 0500 " this am. Patient 9 weeks post partum normal vaginal. Pain is sharp in sensation with radiation to bilateral chest.)      Discharge Diagnoses     Active Hospital Problems   No active problems to display.      Resolved Hospital Problems    Diagnosis Date Resolved POA   • **Acute gallstone pancreatitis [K85.10] 05/18/2023 Yes   • Transaminitis [R74.01] 05/18/2023 Yes   • Elevated white blood cell count [D72.829] 05/18/2023 Yes        Hospital Course     Ms. Obrien is a 22 y.o. female approximately 9 weeks status post a vaginal delivery who presented to [facility] initially complaining of abdominal pain nausea vomiting.  Please see the admitting history and physical for further details.  She was found to have acute gallstone pancreatitis with associated transaminitis and leukocytosis.  She was admitted to the hospital with general surgery consult.  She underwent laparoscopic cholecystectomy yesterday per general surgery with sludge and tiny stones in the cystic duct, mildly inflamed gallbladder, moderate amount of pericholecystic fluid but no evidence of retained stone in the CBD.  Patient feeling better today tolerating a diet and passing flatus but no BM yet.  No nausea or vomiting fever or chills.  WBC is slightly elevated but likely a reactive to surgery.  General surgery has cleared her for discharge today.  She will follow-up in the outpatient setting with surgery and her PCP.      Day of Discharge     Subjective:  Feeling ok.  Tolerating diet.  Expected postop discomfort but manageable    Physical Exam:  Temp:  [97.9 °F (36.6 °C)-98.6 °F (37 °C)] 98.3 °F (36.8 °C)  Heart Rate:  [60-92] 62  Resp:  [16] 16  BP: (104-128)/(70-84) 112/75  Body mass index is 35.43 kg/m².  Physical Exam  Vitals reviewed.   Constitutional:       Appearance: She is well-developed. She is not ill-appearing.   HENT:      Head: Normocephalic and atraumatic.      Mouth/Throat:      Mouth: Mucous membranes are moist.    Cardiovascular:      Rate and Rhythm: Normal rate and regular rhythm.   Pulmonary:      Effort: Pulmonary effort is normal. No respiratory distress.      Breath sounds: Normal breath sounds.   Abdominal:      General: Bowel sounds are normal. There is no distension.      Palpations: Abdomen is soft.      Tenderness: There is no abdominal tenderness.      Comments: Appropriate postop tenderness.  Lap melony sites without complication   Skin:     General: Skin is warm and dry.   Neurological:      General: No focal deficit present.      Mental Status: She is alert and oriented to person, place, and time.   Psychiatric:         Mood and Affect: Mood normal.         Behavior: Behavior normal.         Thought Content: Thought content normal.         Consultants     Consult Orders (all) (From admission, onward)     Start     Ordered    05/16/23 0913  Inpatient General Surgery Consult  Once        Specialty:  General Surgery  Provider:  Darshan Carter MD    05/16/23 0913    05/16/23 0910  Inpatient General Surgery Consult  Once        Specialty:  General Surgery  Provider:  Darshan Carter MD    05/16/23 0909    05/16/23 0702  Inpatient Gastroenterology Consult  IN         Specialty:  Gastroenterology  Provider:  Dev Osborne MD    05/15/23 2302              Procedures     CHOLECYSTECTOMY LAPAROSCOPIC WITH DAVINCI ROBOT with cholangiogram      Imaging Results (All)     Procedure Component Value Units Date/Time    FL Cholangiogram Operative [[id number]] Collected: 05/17/23 1429     Updated: 05/17/23 1434    Narrative:      INTRAOPERATIVE PORTABLE VIEW CHOLANGIOGRAM     CLINICAL INFORMATION: Post cholecystectomy     FINDINGS: Upon injection of the cystic duct with contrast, the proximal  intrahepatic, common hepatic and common bile ducts are opacified.  Contrast passes into the duodenum. No filling defects are visualized to  suggest choledocholithiasis.     Fluoroscopy time 48 s, 314 images     This report  was finalized on 5/17/2023 2:31 PM by Dr. Clau Montoya M.D.       CT Abdomen Pelvis With Contrast [[id number]] Collected: 05/15/23 2059     Updated: 05/15/23 2108    Narrative:      CT ABDOMEN PELVIS W CONTRAST-     HISTORY: Intermittent epigastric, chest, and right upper quadrant pain  that started a few weeks after she gave birth in March.     TECHNIQUE:  CT of the abdomen and pelvis was performed following the  administration of intravenous contrast.  Radiation dose reduction  techniques were utilized, including automated exposure control and  exposure modulation based on body size.     COMPARISON:  None.     FINDINGS:     Heart size is normal. There is no pericardial effusion. Lung bases and  pleural spaces are clear.  The liver is normal in size. No suspicious focal intrahepatic lesion is  identified. There is suspected cholelithiasis. The spleen is normal in  size. There is moderate peripancreatic fat stranding/edema with small  volume peripancreatic fluid. There is no discrete or drainable  collection. The adrenal glands are within normal limits. The kidneys are  within normal limits.  No pathologically enlarged abdominal or pelvic lymph nodes are  identified. The abdominal aorta is normal in caliber.   There is no bowel obstruction. The appendix is visualized. The uterus is  present and slightly heterogeneous, consistent with recent postgravid  state. There is no adnexal mass. The bladder is poorly distended.  The visualized osseous structures are age-appropriate.          Impression:         1.  Acute pancreatitis without a discrete or drainable fluid collection.  2.  Suspected cholelithiasis. Given the presence of concurrent  pancreatitis, further evaluation with right upper quadrant ultrasound or  ERCP/MRCP should be considered to exclude gallstone pancreatitis.     This report was finalized on 5/15/2023 9:05 PM by Dr. Clau Montoya M.D.               Pertinent Labs     Results from last 7 days   Lab  Units 05/18/23  0450 05/17/23  0440 05/16/23  0620 05/15/23  1937   WBC 10*3/mm3 12.43* 9.66 11.02* 13.55*   HEMOGLOBIN g/dL 12.0 12.1 13.9 14.6   PLATELETS 10*3/mm3 328 306 356 391     Results from last 7 days   Lab Units 05/18/23  0449 05/17/23  0440 05/16/23  0620 05/15/23  1937   SODIUM mmol/L 141 138 140 137  137   POTASSIUM mmol/L 4.1 3.2* 4.1 3.8  3.8   CHLORIDE mmol/L 110* 106 108* 103  103   CO2 mmol/L 24.0 26.0 23.5 22.3  22.3   BUN mg/dL 5* 5* 9 11  11   CREATININE mg/dL 0.43* 0.53* 0.58 0.67  0.67   GLUCOSE mg/dL 106* 94 115* 115*  115*   EGFR mL/min/1.73 139.5 132.6 129.8 125.3  125.3     Results from last 7 days   Lab Units 05/18/23  0449 05/17/23  0440 05/16/23  0620 05/15/23  1937   ALBUMIN g/dL 3.6 3.5 3.9 4.4   BILIRUBIN mg/dL 0.4 0.5 1.0 1.8*   ALK PHOS U/L 136* 152* 206* 221*   AST (SGOT) U/L 48* 104* 462* 850*   ALT (SGPT) U/L 180* 280* 546* 578*     Results from last 7 days   Lab Units 05/18/23  0449 05/17/23  0440 05/16/23  0620 05/15/23  1937   CALCIUM mg/dL 8.7 8.4* 8.8 9.4  9.4   ALBUMIN g/dL 3.6 3.5 3.9 4.4   MAGNESIUM mg/dL  --  1.9  --   --      Results from last 7 days   Lab Units 05/17/23  0440 05/16/23  0620 05/15/23  1937   AMYLASE U/L 114*  --   --    LIPASE U/L 172* 1,299* >3,000*         Results from last 7 days   Lab Units 05/17/23  0440   CHOLESTEROL mg/dL 152   TRIGLYCERIDES mg/dL 73   HDL CHOL mg/dL 46   LDL CHOL mg/dL 92             Test Results Pending at Discharge     Pending Labs     Order Current Status    Tissue Pathology Exam In process          Discharge Details        Discharge Medications      New Medications      Instructions Start Date   acetaminophen 325 MG tablet  Commonly known as: Tylenol   650 mg, Oral, Every 6 Hours PRN      docusate sodium 100 MG capsule  Commonly known as: Colace   100 mg, Oral, 2 Times Daily PRN      famotidine 20 MG tablet  Commonly known as: Pepcid   20 mg, Oral, 2 Times Daily      HYDROcodone-acetaminophen 5-325 MG per  tablet  Commonly known as: NORCO   1 tablet, Oral, Every 4 Hours PRN         Continue These Medications      Instructions Start Date   ibuprofen 600 MG tablet  Commonly known as: ADVIL,MOTRIN   600 mg, Oral, Every 6 Hours PRN             No Known Allergies    Discharge Disposition:  Home or Self Care      Discharge Diet:  Diet Order   Procedures   • Diet: Regular/House Diet, Gastrointestinal Diets; Fat-Restricted; Texture: Regular Texture (IDDSI 7); Fluid Consistency: Thin (IDDSI 0)       Discharge Activity:       CODE STATUS:    Code Status and Medical Interventions:   Ordered at: 05/15/23 4001     Code Status (Patient has no pulse and is not breathing):    CPR (Attempt to Resuscitate)     Medical Interventions (Patient has pulse or is breathing):    Full Support       Future Appointments   Date Time Provider Department Center   5/21/2024  9:10 AM Winsome Quintero MD [facility]      Follow-up Information     Provider, No Known .    Contact information:  [facility] [address]             Darshan Carter MD Follow up in 2 week(s).    Specialty: General Surgery  Contact information:  [facility] [address] [address] [phone]                         Time Spent on Discharge:  Greater than 55 minutes      GARCÍA Brooks  [facility]  05/18/23  11:33 EDT              Electronically signed by Meron Varner APRN at 05/18/23 4649

## 2023-05-19 LAB
LAB AP CASE REPORT: NORMAL
PATH REPORT.FINAL DX SPEC: NORMAL
PATH REPORT.GROSS SPEC: NORMAL

## 2023-06-05 ENCOUNTER — OFFICE VISIT (OUTPATIENT)
Dept: SURGERY | Facility: CLINIC | Age: 23
End: 2023-06-05
Payer: COMMERCIAL

## 2023-06-05 VITALS
BODY MASS INDEX: 37.03 KG/M2 | SYSTOLIC BLOOD PRESSURE: 115 MMHG | DIASTOLIC BLOOD PRESSURE: 68 MMHG | HEIGHT: 63 IN | WEIGHT: 209 LBS

## 2023-06-05 DIAGNOSIS — K85.10 ACUTE BILIARY PANCREATITIS WITHOUT INFECTION OR NECROSIS: Primary | ICD-10-CM

## 2023-06-05 PROBLEM — E66.9 OBESITY (BMI 35.0-39.9 WITHOUT COMORBIDITY): Status: ACTIVE | Noted: 2023-06-05

## 2023-06-05 PROCEDURE — 99024 POSTOP FOLLOW-UP VISIT: CPT | Performed by: SURGERY

## 2023-06-05 NOTE — PROGRESS NOTES
Postop robotic cholecystectomy    Subjective:  Feels well, minimal discomfort at this time, appetite is good and bowel function in normal range.    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

## 2023-06-05 NOTE — PATIENT INSTRUCTIONS
Obesity, Adult  Obesity is the condition of having too much total body fat. Being overweight or obese means that your weight is greater than what is considered healthy for your body size. Obesity is determined by a measurement called BMI (body mass index). BMI is an estimate of body fat and is calculated from height and weight. For adults, a BMI of 30 or higher is considered obese.  Obesity can lead to other health concerns and major illnesses, including:  Stroke.  Coronary artery disease (CAD).  Type 2 diabetes.  Some types of cancer, including cancers of the colon, breast, uterus, and gallbladder.  High blood pressure (hypertension).  High cholesterol.  Gallbladder stones.  Obesity can also contribute to:  Osteoarthritis.  Sleep apnea.  Infertility problems.  What are the causes?  Common causes of this condition include:  Eating daily meals that are high in calories, sugar, and fat.  Drinking high amounts of sugar-sweetened beverages, such as soft drinks.  Being born with genes that may make you more likely to become obese.  Having a medical condition that causes obesity, including:  Hypothyroidism.  Polycystic ovarian syndrome (PCOS).  Binge-eating disorder.  Cushing syndrome.  Taking certain medicines, such as steroids, antidepressants, and seizure medicines.  Not being physically active (sedentary lifestyle).  Not getting enough sleep.  What increases the risk?  The following factors may make you more likely to develop this condition:  Having a family history of obesity.  Living in an area with limited access to:  [place], recreation centers, or sidewalks.  Healthy food choices, such as grocery stores and [employer] markets.  What are the signs or symptoms?  The main sign of this condition is having too much body fat.  How is this diagnosed?  This condition is diagnosed based on:  Your BMI. If you are an adult with a BMI of 30 or higher, you are considered obese.  Your waist circumference. This measures the  distance around your waistline.  Your skinfold thickness. Your health care provider may gently pinch a fold of your skin and measure it.  You may have other tests to check for underlying conditions.  How is this treated?  Treatment for this condition often includes changing your lifestyle. Treatment may include some or all of the following:  Dietary changes. This may include developing a healthy meal plan.  Regular physical activity. This may include activity that causes your heart to beat faster (aerobic exercise) and strength training. Work with your health care provider to design an exercise program that works for you.  Medicine to help you lose weight if you are unable to lose one pound a week after six weeks of healthy eating and more physical activity.  Treating conditions that cause the obesity (underlying conditions).  Surgery. Surgical options may include gastric banding and gastric bypass. Surgery may be done if:  Other treatments have not helped to improve your condition.  You have a BMI of 40 or higher.  You have life-threatening health problems related to obesity.  Follow these instructions at home:  Eating and drinking    Follow recommendations from your health care provider about what you eat and drink. Your health care provider may advise you to:  Limit fast food, sweets, and processed snack foods.  Choose low-fat options, such as low-fat milk instead of whole milk.  Eat five or more servings of fruits or vegetables every day.  Choose healthy foods when you eat out.  Keep low-fat snacks available.  Limit sugary drinks, such as soda, fruit juice, sweetened iced tea, and flavored milk.  Drink enough water to keep your urine pale yellow.  Do not follow a fad diet. Fad diets can be unhealthy and even dangerous.  Other healthful choices include:  Eat at home more often. This gives you more control over what you eat.  Learn to read food labels. This will help you understand how much food is considered one  serving.  Learn what a healthy serving size is.  Physical activity  Exercise regularly, as told by your health care provider.  Most adults should get up to 150 minutes of moderate-intensity exercise every week.  Ask your health care provider what types of exercise are safe for you and how often you should exercise.  Warm up and stretch before being active.  Cool down and stretch after being active.  Rest between periods of activity.  Lifestyle  Work with your health care provider and a dietitian to set a weight-loss goal that is healthy and reasonable for you.  Limit your screen time.  Find ways to reward yourself that do not involve food.  Do not drink alcohol if:  Your health care provider tells you not to drink.  You are pregnant, may be pregnant, or are planning to become pregnant.  If you drink alcohol:  Limit how much you have to:  0-1 drink a day for women.  0-2 drinks a day for men.  Know how much alcohol is in your drink. In the [place] one drink equals one 12 oz bottle of beer (355 mL), one 5 oz glass of wine (148 mL), or one 1½ oz glass of hard liquor (44 mL).  General instructions  Keep a weight-loss journal to keep track of the food you eat and how much exercise you get.  Take over-the-counter and prescription medicines only as told by your health care provider.  Take vitamins and supplements only as told by your health care provider.  Consider joining a support group. Your health care provider may be able to recommend a support group.  Pay attention to your mental health as obesity can lead to depression or self esteem issues.  Keep all follow-up visits. This is important.  Contact a health care provider if:  You are unable to meet your weight-loss goal after six weeks of dietary and lifestyle changes.  You have trouble breathing.  Summary  Obesity is the condition of having too much total body fat.  Being overweight or obese means that your weight is greater than what is considered healthy for your body  size.  Work with your health care provider and a dietitian to set a weight-loss goal that is healthy and reasonable for you.  Exercise regularly, as told by your health care provider. Ask your health care provider what types of exercise are safe for you and how often you should exercise.  This information is not intended to replace advice given to you by your health care provider. Make sure you discuss any questions you have with your health care provider.  Document Revised: 07/26/2022 Document Reviewed: 07/26/2022  Elsevier Patient Education © 2022 [employer] Inc[employer]

## 2023-06-07 ENCOUNTER — OFFICE VISIT (OUTPATIENT)
Dept: INTERNAL MEDICINE | Facility: CLINIC | Age: 23
End: 2023-06-07
Payer: COMMERCIAL

## 2023-06-07 VITALS
WEIGHT: 202 LBS | HEIGHT: 63 IN | TEMPERATURE: 98.1 F | SYSTOLIC BLOOD PRESSURE: 100 MMHG | BODY MASS INDEX: 35.79 KG/M2 | DIASTOLIC BLOOD PRESSURE: 68 MMHG | HEART RATE: 78 BPM | OXYGEN SATURATION: 99 %

## 2023-06-07 DIAGNOSIS — E66.9 OBESITY (BMI 35.0-39.9 WITHOUT COMORBIDITY): ICD-10-CM

## 2023-06-07 DIAGNOSIS — Z00.00 ANNUAL PHYSICAL EXAM: Primary | ICD-10-CM

## 2023-06-07 LAB
ALBUMIN SERPL-MCNC: 4.7 G/DL (ref 3.5–5.2)
ALBUMIN/GLOB SERPL: 1.8 G/DL
ALP SERPL-CCNC: 114 U/L (ref 39–117)
ALT SERPL-CCNC: 33 U/L (ref 1–33)
AST SERPL-CCNC: 28 U/L (ref 1–32)
BASOPHILS # BLD AUTO: 0.04 10*3/MM3 (ref 0–0.2)
BASOPHILS NFR BLD AUTO: 0.5 % (ref 0–1.5)
BILIRUB SERPL-MCNC: 0.5 MG/DL (ref 0–1.2)
BUN SERPL-MCNC: 9 MG/DL (ref 6–20)
BUN/CREAT SERPL: 13.8 (ref 7–25)
CALCIUM SERPL-MCNC: 10.4 MG/DL (ref 8.6–10.5)
CHLORIDE SERPL-SCNC: 101 MMOL/L (ref 98–107)
CO2 SERPL-SCNC: 24.5 MMOL/L (ref 22–29)
CREAT SERPL-MCNC: 0.65 MG/DL (ref 0.57–1)
EGFRCR SERPLBLD CKD-EPI 2021: 125.5 ML/MIN/1.73
EOSINOPHIL # BLD AUTO: 0.11 10*3/MM3 (ref 0–0.4)
EOSINOPHIL NFR BLD AUTO: 1.3 % (ref 0.3–6.2)
ERYTHROCYTE [DISTWIDTH] IN BLOOD BY AUTOMATED COUNT: 15 % (ref 12.3–15.4)
GLOBULIN SER CALC-MCNC: 2.6 GM/DL
GLUCOSE SERPL-MCNC: 89 MG/DL (ref 65–99)
HCT VFR BLD AUTO: 42.8 % (ref 34–46.6)
HGB BLD-MCNC: 14.2 G/DL (ref 12–15.9)
IMM GRANULOCYTES # BLD AUTO: 0.02 10*3/MM3 (ref 0–0.05)
IMM GRANULOCYTES NFR BLD AUTO: 0.2 % (ref 0–0.5)
LYMPHOCYTES # BLD AUTO: 1.81 10*3/MM3 (ref 0.7–3.1)
LYMPHOCYTES NFR BLD AUTO: 21.9 % (ref 19.6–45.3)
MCH RBC QN AUTO: 26.6 PG (ref 26.6–33)
MCHC RBC AUTO-ENTMCNC: 33.2 G/DL (ref 31.5–35.7)
MCV RBC AUTO: 80.3 FL (ref 79–97)
MONOCYTES # BLD AUTO: 0.64 10*3/MM3 (ref 0.1–0.9)
MONOCYTES NFR BLD AUTO: 7.7 % (ref 5–12)
NEUTROPHILS # BLD AUTO: 5.66 10*3/MM3 (ref 1.7–7)
NEUTROPHILS NFR BLD AUTO: 68.4 % (ref 42.7–76)
NRBC BLD AUTO-RTO: 0 /100 WBC (ref 0–0.2)
PLATELET # BLD AUTO: 420 10*3/MM3 (ref 140–450)
POTASSIUM SERPL-SCNC: 4.7 MMOL/L (ref 3.5–5.2)
PROT SERPL-MCNC: 7.3 G/DL (ref 6–8.5)
RBC # BLD AUTO: 5.33 10*6/MM3 (ref 3.77–5.28)
SODIUM SERPL-SCNC: 137 MMOL/L (ref 136–145)
WBC # BLD AUTO: 8.28 10*3/MM3 (ref 3.4–10.8)

## 2024-05-20 NOTE — PROGRESS NOTES
Subjective   Chief Complaint   Patient presents with    Annual Exam      Well woman exam     Viry Obrien is a 23 y.o. year old  presenting to be seen for her annual exam.   Her last Pap was 2022 with normal cytology.  She had her gallbladder removed last year.   SEXUAL Hx:  She is currently sexually active.  In the past year there there has been NO new sexual partners.    Condoms are always used.  She would not like to be screened for STD's at today's exam.  Current birth control method: condoms.  She is happy with her current method of contraception and does not want to discuss alternative methods of contraception.  MENSTRUAL Hx:  Patient's last menstrual period was 2024 (approximate).  In the past 6 months her cycles have been regular, predictable and occur monthly.  Her menstrual flow is typically normal.   Each month on average there are roughly 0 day(s) of very heavy flow.    Intermenstrual bleeding is absent.    Post-coital bleeding is absent.  Dysmenorrhea: is not affecting her activities of daily living  PMS: is not affecting her activities of daily living  Her cycles are not a source of concern for her that she wishes to discuss today.  HEALTH Hx:  She exercises regularly: no (but is planning to start exercising more).  She wears her seat belt: yes.  She has concerns about domestic violence: no.  OTHER THINGS SHE WANTS TO DISCUSS TODAY:  Nothing else    The following portions of the patient's history were reviewed and updated as appropriate:problem list, current medications, allergies, past family history, past medical history, past social history, and past surgical history.    Social History    Tobacco Use      Smoking status: Never      Smokeless tobacco: Never    Review of Systems  Constitutional POS: nothing reported    NEG: anorexia or night sweats   Genitourinary POS: nothing reported    NEG: dysuria or hematuria      Gastointestinal POS: nothing reported    NEG: bloating,  "change in bowel habits, melena, or reflux symptoms   Integument POS: nothing reported    NEG: moles that are changing in size, shape, color or rashes   Breast POS: nothing reported    NEG: persistent breast lump, skin dimpling, or nipple discharge        Objective   /80 (BP Location: Left arm, Patient Position: Sitting, Cuff Size: Adult)   Ht 157.5 cm (62\")   Wt 93.5 kg (206 lb 3.2 oz)   LMP 04/21/2024 (Approximate)   BMI 37.71 kg/m²     General:  well developed; well nourished  no acute distress   Skin:  No suspicious lesions seen   Thyroid: normal to inspection and palpation   Breasts:  Examined in supine position  Symmetric without masses or skin dimpling  Nipples normal without inversion, lesions or discharge  There are no palpable axillary nodes   Abdomen: soft, non-tender; no masses  no umbilical or inguinal hernias are present  no hepato-splenomegaly   Pelvis: Clinical staff was present for exam  :  urethral meatus normal;  Vaginal:  normal pink mucosa without prolapse or lesions.  Cervix:  normal appearance. friable;  Uterus:  normal size, shape and consistency.  Adnexa:  normal bimanual exam of the adnexa.  Rectal:  digital rectal exam not performed; anus visually normal appearing.        Assessment   Well woman with routine gynecological exam     Plan     Pap was done today.  If she does not receive the results of the Pap within 2 weeks  time, she was instructed to call to find out the results.  I explained to Viry that the recommendations for Pap smear interval in a low risk patient's has lengthened to 3 years time.  I encouraged her to be seen yearly for a full physical exam including breast and pelvic exam even during the off years when PAP's will not be performed.  No prescription was given or electronically sent at today's visit  The importance of keeping all planned follow-up and taking all medications as prescribed was emphasized.  Today I discussed with Viry the total recommended " calcium intake for a premenopausal female is 1000 mg.  Ideally this should be from dietary sources.  I reviewed calcium content in various foods including milk, fortified orange juice and yogurt.  If she cannot get sufficient calcium through dietary means, it is recommended to supplement with either a multivitamin or calcium to reach her daily goal.  I also reviewed the difference in the bioavailability of calcium carbonate and calcium citrate containing supplements and the importance of taking calcium carbonate containing products with food.  Finally, vitamin D's role in calcium absorption was reviewed and a total daily vitamin D intake of 800 units was recommended.  Follow up for annual exam 1 year    No orders of the defined types were placed in this encounter.         This note was electronically signed.    Asia Laws, GARCÍA  May 21, 2024

## 2024-05-21 ENCOUNTER — OFFICE VISIT (OUTPATIENT)
Dept: OBSTETRICS AND GYNECOLOGY | Facility: CLINIC | Age: 24
End: 2024-05-21
Payer: COMMERCIAL

## 2024-05-21 VITALS
BODY MASS INDEX: 37.94 KG/M2 | DIASTOLIC BLOOD PRESSURE: 80 MMHG | SYSTOLIC BLOOD PRESSURE: 110 MMHG | HEIGHT: 62 IN | WEIGHT: 206.2 LBS

## 2024-05-21 DIAGNOSIS — Z01.419 WELL WOMAN EXAM WITH ROUTINE GYNECOLOGICAL EXAM: Primary | ICD-10-CM

## 2024-05-21 PROCEDURE — 99459 PELVIC EXAMINATION: CPT | Performed by: NURSE PRACTITIONER

## 2024-05-21 PROCEDURE — 99395 PREV VISIT EST AGE 18-39: CPT | Performed by: NURSE PRACTITIONER

## 2024-05-24 LAB — REF LAB TEST METHOD: NORMAL

## (undated) DEVICE — THE STERILE LIGHT HANDLE COVER IS USED WITH STERIS SURGICAL LIGHTING AND VISUALIZATION SYSTEMS.

## (undated) DEVICE — DRAPE,REIN 53X77,STERILE: Brand: MEDLINE

## (undated) DEVICE — PATIENT RETURN ELECTRODE, SINGLE-USE, CONTACT QUALITY MONITORING, ADULT, WITH 9FT CORD, FOR PATIENTS WEIGING OVER 33LBS. (15KG): Brand: MEGADYNE

## (undated) DEVICE — UNDYED BRAIDED (POLYGLACTIN 910), SYNTHETIC ABSORBABLE SUTURE: Brand: COATED VICRYL

## (undated) DEVICE — SUT MNCRYL PLS ANTIB UD 4/0 PS2 18IN

## (undated) DEVICE — GOWN,NON-REINFORCED,SIRUS,SET IN SLV,XL: Brand: MEDLINE

## (undated) DEVICE — LAPAROVUE VISIBILITY SYSTEM LAPAROSCOPIC SOLUTIONS: Brand: LAPAROVUE

## (undated) DEVICE — APPL CHLORAPREP HI/LITE 26ML ORNG

## (undated) DEVICE — TISSUE RETRIEVAL SYSTEM: Brand: INZII RETRIEVAL SYSTEM

## (undated) DEVICE — STRIP,CLOSURE,WOUND,MEDI-STRIP,1/2X4: Brand: MEDLINE

## (undated) DEVICE — SYR LUERLOK 30CC

## (undated) DEVICE — CANNULA SEAL

## (undated) DEVICE — ARM DRAPE

## (undated) DEVICE — DRP C/ARM 41X74IN

## (undated) DEVICE — GLV SURG BIOGEL LTX PF 8 1/2

## (undated) DEVICE — LOU LAP CHOLE: Brand: MEDLINE INDUSTRIES, INC.

## (undated) DEVICE — STPCK 3WY D201 DISCOFIX

## (undated) DEVICE — CATH IV INSYTE AUTOGARD 14G 1 1/2IN ORNG

## (undated) DEVICE — COLUMN DRAPE

## (undated) DEVICE — BLADELESS OBTURATOR: Brand: WECK VISTA

## (undated) DEVICE — EXTENSION SET, MALE LUER LOCK ADAPTER WITH RETRACTABLE COLLAR

## (undated) DEVICE — DRSNG WND BORDR/ADHS NONADHR/GZ LF 2X2IN STRL

## (undated) DEVICE — LAPAROSCOPIC SMOKE FILTRATION SYSTEM: Brand: PALL LAPAROSHIELD® PLUS LAPAROSCOPIC SMOKE FILTRATION SYSTEM

## (undated) DEVICE — SOL ANTISTICK CAUTRY ELECTROLUBE LF

## (undated) DEVICE — 3M™ STERI-STRIP™ COMPOUND BENZOIN TINCTURE 40 BAGS/CARTON 4 CARTONS/CASE C1544: Brand: 3M™ STERI-STRIP™

## (undated) DEVICE — CATH URETRL SOF/FLEX OPN/END 4F 70CM